# Patient Record
Sex: MALE | Race: WHITE | NOT HISPANIC OR LATINO | URBAN - METROPOLITAN AREA
[De-identification: names, ages, dates, MRNs, and addresses within clinical notes are randomized per-mention and may not be internally consistent; named-entity substitution may affect disease eponyms.]

---

## 2017-04-12 ENCOUNTER — APPOINTMENT (RX ONLY)
Dept: URBAN - METROPOLITAN AREA CLINIC 124 | Facility: CLINIC | Age: 77
Setting detail: DERMATOLOGY
End: 2017-04-12

## 2017-04-12 DIAGNOSIS — L0390 CELLULITIS AND ABSCESS OF UNSPECIFIED SITES: ICD-10-CM

## 2017-04-12 DIAGNOSIS — L0391 CELLULITIS AND ABSCESS OF UNSPECIFIED SITES: ICD-10-CM

## 2017-04-12 PROBLEM — C18.9 MALIGNANT NEOPLASM OF COLON, UNSPECIFIED: Status: ACTIVE | Noted: 2017-04-12

## 2017-04-12 PROBLEM — L03.818 CELLULITIS OF OTHER SITES: Status: ACTIVE | Noted: 2017-04-12

## 2017-04-12 PROBLEM — L23.7 ALLERGIC CONTACT DERMATITIS DUE TO PLANTS, EXCEPT FOOD: Status: ACTIVE | Noted: 2017-04-12

## 2017-04-12 PROBLEM — L55.1 SUNBURN OF SECOND DEGREE: Status: ACTIVE | Noted: 2017-04-12

## 2017-04-12 PROCEDURE — ? COUNSELING

## 2017-04-12 PROCEDURE — 99202 OFFICE O/P NEW SF 15 MIN: CPT

## 2017-04-12 PROCEDURE — ? PATIENT SPECIFIC COUNSELING

## 2017-04-12 NOTE — PROCEDURE: PATIENT SPECIFIC COUNSELING
Detail Level: Simple
Clinically area appears to be resolving at this time. Recommend photo today and f/u in 2 days. Patient will continue the Bactrim as prescribed. Instructed him to go to the ED if he notes worsening redness or swelling between now and then. He has no systemic symptoms.

## 2017-04-14 ENCOUNTER — APPOINTMENT (RX ONLY)
Dept: URBAN - METROPOLITAN AREA CLINIC 124 | Facility: CLINIC | Age: 77
Setting detail: DERMATOLOGY
End: 2017-04-14

## 2017-04-14 DIAGNOSIS — L82.1 OTHER SEBORRHEIC KERATOSIS: ICD-10-CM

## 2017-04-14 DIAGNOSIS — L0390 CELLULITIS AND ABSCESS OF UNSPECIFIED SITES: ICD-10-CM | Status: RESOLVING

## 2017-04-14 DIAGNOSIS — L57.8 OTHER SKIN CHANGES DUE TO CHRONIC EXPOSURE TO NONIONIZING RADIATION: ICD-10-CM

## 2017-04-14 DIAGNOSIS — L0391 CELLULITIS AND ABSCESS OF UNSPECIFIED SITES: ICD-10-CM | Status: RESOLVING

## 2017-04-14 DIAGNOSIS — L81.4 OTHER MELANIN HYPERPIGMENTATION: ICD-10-CM

## 2017-04-14 PROBLEM — L03.312 CELLULITIS OF BACK [ANY PART EXCEPT BUTTOCK]: Status: ACTIVE | Noted: 2017-04-14

## 2017-04-14 PROCEDURE — 99214 OFFICE O/P EST MOD 30 MIN: CPT

## 2017-04-14 PROCEDURE — ? PRESCRIPTION

## 2017-04-14 RX ORDER — SULFAMETHOXAZOLE AND TRIMETHOPRIM 800; 160 MG/1; MG/1
TABLET ORAL BID
Qty: 14 | Refills: 0 | Status: ERX | COMMUNITY
Start: 2017-04-14

## 2017-04-14 RX ADMIN — SULFAMETHOXAZOLE AND TRIMETHOPRIM: 800; 160 TABLET ORAL at 18:44

## 2017-04-14 ASSESSMENT — LOCATION DETAILED DESCRIPTION DERM
LOCATION DETAILED: RIGHT MEDIAL SUPERIOR CHEST
LOCATION DETAILED: RIGHT INFERIOR MEDIAL FOREHEAD
LOCATION DETAILED: RIGHT MID-UPPER BACK
LOCATION DETAILED: LEFT SUPERIOR MEDIAL MIDBACK

## 2017-04-14 ASSESSMENT — LOCATION ZONE DERM
LOCATION ZONE: TRUNK
LOCATION ZONE: FACE

## 2017-04-14 ASSESSMENT — LOCATION SIMPLE DESCRIPTION DERM
LOCATION SIMPLE: LEFT LOWER BACK
LOCATION SIMPLE: RIGHT UPPER BACK
LOCATION SIMPLE: CHEST
LOCATION SIMPLE: RIGHT FOREHEAD

## 2017-04-14 NOTE — HPI: EVALUATION OF SKIN LESION(S)
How Severe Are Your Spot(S)?: moderate
Have Your Spot(S) Been Treated In The Past?: has been treated
Hpi Title: Evaluation of Skin Lesions
When Was It Treated?: 04/13/2017

## 2017-04-14 NOTE — PROCEDURE: MIPS QUALITY
Quality 47: Advance Care Plan: Advance Care Planning discussed and documented in the medical record; patient did not wish or was not able to name a surrogate decision maker or provide an advance care plan.
Detail Level: Detailed
Quality 110: Preventive Care And Screening: Influenza Immunization: Influenza Immunization previously received during influenza season
Quality 131: Pain Assessment And Follow-Up: Pain assessment using a standardized tool is documented as negative, no follow-up plan required
Quality 111:Pneumonia Vaccination Status For Older Adults: Pneumococcal Vaccination not Administered or Previously Received, Reason not Otherwise Specified
Quality 226: Preventive Care And Screening: Tobacco Use: Screening And Cessation Intervention: Patient screened for tobacco and never smoked
Quality 130: Documentation Of Current Medications In The Medical Record: Current Medications Documented

## 2017-04-17 ENCOUNTER — APPOINTMENT (RX ONLY)
Dept: URBAN - METROPOLITAN AREA CLINIC 124 | Facility: CLINIC | Age: 77
Setting detail: DERMATOLOGY
End: 2017-04-17

## 2017-04-26 ENCOUNTER — APPOINTMENT (RX ONLY)
Dept: URBAN - METROPOLITAN AREA CLINIC 124 | Facility: CLINIC | Age: 77
Setting detail: DERMATOLOGY
End: 2017-04-26

## 2017-04-26 DIAGNOSIS — L0390 CELLULITIS AND ABSCESS OF UNSPECIFIED SITES: ICD-10-CM | Status: IMPROVED

## 2017-04-26 DIAGNOSIS — L0391 CELLULITIS AND ABSCESS OF UNSPECIFIED SITES: ICD-10-CM | Status: IMPROVED

## 2017-04-26 PROBLEM — L03.818 CELLULITIS OF OTHER SITES: Status: ACTIVE | Noted: 2017-04-26

## 2017-04-26 PROCEDURE — 99212 OFFICE O/P EST SF 10 MIN: CPT

## 2017-04-26 PROCEDURE — ? PATIENT SPECIFIC COUNSELING

## 2017-04-26 PROCEDURE — ? COUNSELING

## 2017-04-26 NOTE — PROCEDURE: PATIENT SPECIFIC COUNSELING
Detail Level: Zone
Original inflammation on the left upper back may have been cystic in nature and could require an excision. If he notes recurrence of the inflammation, he will contact our office immediately.

## 2018-08-14 RX ORDER — ASPIRIN 81 MG/1
81 TABLET ORAL DAILY
COMMUNITY
Start: 2014-08-21

## 2018-08-14 RX ORDER — ATORVASTATIN CALCIUM 40 MG/1
40 TABLET, FILM COATED ORAL DAILY
COMMUNITY
Start: 2017-09-28 | End: 2018-08-16 | Stop reason: SDUPTHER

## 2018-08-16 ENCOUNTER — OFFICE VISIT (OUTPATIENT)
Dept: CARDIOLOGY | Facility: CLINIC | Age: 78
End: 2018-08-16
Payer: MEDICARE

## 2018-08-16 VITALS
BODY MASS INDEX: 30.06 KG/M2 | SYSTOLIC BLOOD PRESSURE: 120 MMHG | RESPIRATION RATE: 16 BRPM | WEIGHT: 210 LBS | HEART RATE: 67 BPM | DIASTOLIC BLOOD PRESSURE: 70 MMHG | HEIGHT: 70 IN

## 2018-08-16 DIAGNOSIS — I25.10 CAD IN NATIVE ARTERY: Primary | ICD-10-CM

## 2018-08-16 PROBLEM — F17.201 TOBACCO ABUSE, IN REMISSION: Status: ACTIVE | Noted: 2018-08-16

## 2018-08-16 PROCEDURE — 99214 OFFICE O/P EST MOD 30 MIN: CPT | Performed by: INTERNAL MEDICINE

## 2018-08-16 PROCEDURE — 93000 ELECTROCARDIOGRAM COMPLETE: CPT | Performed by: INTERNAL MEDICINE

## 2018-08-16 RX ORDER — ATORVASTATIN CALCIUM 40 MG/1
40 TABLET, FILM COATED ORAL DAILY
Qty: 90 TABLET | Refills: 2 | Status: SHIPPED | OUTPATIENT
Start: 2018-08-16 | End: 2018-08-28 | Stop reason: SDUPTHER

## 2018-08-16 RX ORDER — TRIAMCINOLONE ACETONIDE 1 MG/G
OINTMENT TOPICAL
Refills: 3 | COMMUNITY
Start: 2018-06-21

## 2018-08-16 ASSESSMENT — ENCOUNTER SYMPTOMS
HEMATOLOGIC/LYMPHATIC NEGATIVE: 1
GASTROINTESTINAL NEGATIVE: 1
NEUROLOGICAL NEGATIVE: 1
RESPIRATORY NEGATIVE: 1
ENDOCRINE NEGATIVE: 1
CONSTITUTIONAL NEGATIVE: 1
CARDIOVASCULAR NEGATIVE: 1
PSYCHIATRIC NEGATIVE: 1
EYES NEGATIVE: 1

## 2018-08-16 NOTE — LETTER
August 16, 2018     Armin Aaron ., DO  [address] [place] [address]    Patient: Naif De La Garza   YOB: 1940   Date of Visit: 8/16/2018       Dear Dr. Aaron:    Thank you for referring Naif De La Garza to me for evaluation. Below are my notes for this consultation.    If you have questions, please do not hesitate to call me. I look forward to following your patient along with you.         Sincerely,        Andrew Barrow DO        CC: No Recipients  Andrew Barrow DO  8/16/2018 11:25 AM  Signed      Chief Complaint: I saw Baljeet in the office today on a routine visit for his hypercholesterolemia.  As you know he has had a bad CT calcium score but has had a negative stress test for active ischemic heart disease.  He also has an elevated cholesterol which were treating him with atorvastatin 40 mg daily.  Baljeet has no complaints of chest discomfort or shortness of breath with exertion, anxiety, cold weather, postprandially, with sex, rest, or nocturnally.  He denies exertional dyspnea, proximal nocturnal dyspnea, orthopnea, palpitations, syncope, ankle edema, he has nocturia.       Medications:  Current Outpatient Prescriptions on File Prior to Visit   Medication Sig Dispense Refill   • aspirin (ASPIR-LOW) 81 mg enteric coated tablet Take 81 mg by mouth daily.     • atorvastatin (LIPITOR) 40 mg tablet Take 40 mg by mouth daily.     • vitamins  A,C,E-zinc-copper (PRESERVISION AREDS) 14,320-226-200 unit-mg-unit capsule 2 capsules daily       No current facility-administered medications on file prior to visit.        Review of Systems:  Review of Systems   Constitution: Negative.   HENT: Negative.    Eyes: Negative.    Cardiovascular: Negative.    Respiratory: Negative.    Endocrine: Negative.    Hematologic/Lymphatic: Negative.    Skin: Positive for rash.   Musculoskeletal: Positive for arthritis.   Gastrointestinal: Negative.    Genitourinary: Positive for nocturia.   Neurological: Negative.     Psychiatric/Behavioral: Negative.    Allergic/Immunologic: Positive for environmental allergies.       Physical Exam:  Vitals:    08/16/18 1036   BP: 120/70   Pulse: 67   Resp: 16     Physical Exam   Constitutional: He is oriented to person, place, and time. He appears well-developed and well-nourished.   overweight   HENT:   Head: Normocephalic and atraumatic.   Eyes: Conjunctivae and EOM are normal. Pupils are equal, round, and reactive to light.   Neck: Normal range of motion. Neck supple.   Cardiovascular: Normal rate, regular rhythm and intact distal pulses.  Exam reveals gallop.    Pulmonary/Chest: Effort normal and breath sounds normal.   Abdominal: Soft. Bowel sounds are normal.   Musculoskeletal: Normal range of motion.   Neurological: He is alert and oriented to person, place, and time. He has normal reflexes.   Skin: Skin is warm and dry.   Psychiatric: He has a normal mood and affect. His behavior is normal. Judgment and thought content normal.     EKG:SRr WNL  Assessment and Plan:  Impression:  Hypercholesterolemia.  Labile blood pressure.  Psoriasis.  Recommendation:  We will see him again in 10 months time a month after he gets his repeat lipid profile with you.  We talked to him extensively about diet and exercise.  If there is a problem we will see him sooner than 10 months.  Thank you so much following see Naif in the office today.    Andrew Barrow,

## 2018-08-16 NOTE — PROGRESS NOTES
Chief Complaint: I saw Baljeet in the office today on a routine visit for his hypercholesterolemia.  As you know he has had a bad CT calcium score but has had a negative stress test for active ischemic heart disease.  He also has an elevated cholesterol which were treating him with atorvastatin 40 mg daily.  Baljeet has no complaints of chest discomfort or shortness of breath with exertion, anxiety, cold weather, postprandially, with sex, rest, or nocturnally.  He denies exertional dyspnea, proximal nocturnal dyspnea, orthopnea, palpitations, syncope, ankle edema, he has nocturia.       Medications:  Current Outpatient Prescriptions on File Prior to Visit   Medication Sig Dispense Refill   • aspirin (ASPIR-LOW) 81 mg enteric coated tablet Take 81 mg by mouth daily.     • atorvastatin (LIPITOR) 40 mg tablet Take 40 mg by mouth daily.     • vitamins  A,C,E-zinc-copper (PRESERVISION AREDS) 14,320-226-200 unit-mg-unit capsule 2 capsules daily       No current facility-administered medications on file prior to visit.        Review of Systems:  Review of Systems   Constitution: Negative.   HENT: Negative.    Eyes: Negative.    Cardiovascular: Negative.    Respiratory: Negative.    Endocrine: Negative.    Hematologic/Lymphatic: Negative.    Skin: Positive for rash.   Musculoskeletal: Positive for arthritis.   Gastrointestinal: Negative.    Genitourinary: Positive for nocturia.   Neurological: Negative.    Psychiatric/Behavioral: Negative.    Allergic/Immunologic: Positive for environmental allergies.       Physical Exam:  Vitals:    08/16/18 1036   BP: 120/70   Pulse: 67   Resp: 16     Physical Exam   Constitutional: He is oriented to person, place, and time. He appears well-developed and well-nourished.   overweight   HENT:   Head: Normocephalic and atraumatic.   Eyes: Conjunctivae and EOM are normal. Pupils are equal, round, and reactive to light.   Neck: Normal range of motion. Neck supple.   Cardiovascular: Normal  rate, regular rhythm and intact distal pulses.  Exam reveals gallop.    Pulmonary/Chest: Effort normal and breath sounds normal.   Abdominal: Soft. Bowel sounds are normal.   Musculoskeletal: Normal range of motion.   Neurological: He is alert and oriented to person, place, and time. He has normal reflexes.   Skin: Skin is warm and dry.   Psychiatric: He has a normal mood and affect. His behavior is normal. Judgment and thought content normal.     EKG:SRr WNL  Assessment and Plan:  Impression:  Hypercholesterolemia.  Labile blood pressure.  Psoriasis.  Recommendation:  We will see him again in 10 months time a month after he gets his repeat lipid profile with you.  We talked to him extensively about diet and exercise.  If there is a problem we will see him sooner than 10 months.  Thank you so much following see Naif in the office today.    Andrew Barrow, DO

## 2018-08-28 RX ORDER — ATORVASTATIN CALCIUM 40 MG/1
40 TABLET, FILM COATED ORAL DAILY
Qty: 90 TABLET | Refills: 3 | Status: SHIPPED | OUTPATIENT
Start: 2018-08-28 | End: 2019-06-06 | Stop reason: SDUPTHER

## 2019-06-06 ENCOUNTER — OFFICE VISIT (OUTPATIENT)
Dept: CARDIOLOGY | Facility: CLINIC | Age: 79
End: 2019-06-06
Payer: MEDICARE

## 2019-06-06 VITALS
DIASTOLIC BLOOD PRESSURE: 70 MMHG | WEIGHT: 214.2 LBS | RESPIRATION RATE: 16 BRPM | BODY MASS INDEX: 30.67 KG/M2 | HEIGHT: 70 IN | HEART RATE: 75 BPM | SYSTOLIC BLOOD PRESSURE: 114 MMHG

## 2019-06-06 DIAGNOSIS — I34.0 NON-RHEUMATIC MITRAL REGURGITATION: Primary | ICD-10-CM

## 2019-06-06 PROCEDURE — 99214 OFFICE O/P EST MOD 30 MIN: CPT | Performed by: INTERNAL MEDICINE

## 2019-06-06 PROCEDURE — 93000 ELECTROCARDIOGRAM COMPLETE: CPT | Performed by: INTERNAL MEDICINE

## 2019-06-06 RX ORDER — ATORVASTATIN CALCIUM 40 MG/1
40 TABLET, FILM COATED ORAL DAILY
Qty: 90 TABLET | Refills: 3 | Status: SHIPPED | OUTPATIENT
Start: 2019-06-06 | End: 2019-12-19 | Stop reason: SDUPTHER

## 2019-06-06 ASSESSMENT — ENCOUNTER SYMPTOMS
BRUISES/BLEEDS EASILY: 1
NEUROLOGICAL NEGATIVE: 1
GASTROINTESTINAL NEGATIVE: 1
EYES NEGATIVE: 1
CARDIOVASCULAR NEGATIVE: 1
CONSTITUTIONAL NEGATIVE: 1
ROS SKIN COMMENTS: ECZEMA
PSYCHIATRIC NEGATIVE: 1
JOINT SWELLING: 1
SPUTUM PRODUCTION: 1
ENDOCRINE NEGATIVE: 1

## 2019-06-06 NOTE — PROGRESS NOTES
Chief Complaint: I saw Naif in the office today on a routine visit for his hypertension mild valvular disease diabetes mellitus type 2 and pulmonary hypertension.  He is doing quite nicely he denies any form of chest discomfort or shortness of breath with exertion, anxiety, cold weather, postprandially, with sex, at rest, or nocturnally.  He can climb a flight of stairs without getting short of breath, he denies proximal nocturnal dyspnea, orthopnea, palpitations, syncope, ankle edema, he has nocturia.  His lipid profile is excellent.       Medications:  Current Outpatient Prescriptions   Medication Sig Dispense Refill   • aspirin (ASPIR-LOW) 81 mg enteric coated tablet Take 81 mg by mouth daily.     • atorvastatin (LIPITOR) 40 mg tablet Take 1 tablet (40 mg total) by mouth daily. 90 tablet 3   • triamcinolone (KENALOG) 0.1 % ointment APPLY TO THE AFFECTED AREA(S) TWICE DAILY FOR UP TO TWO WEEKS AS NEEDED FOR eczema  3   • ubidecarenone (CO Q-10 ORAL) Take 1 tablet by mouth daily.     • vitamins  A,C,E-zinc-copper (PRESERVISION AREDS) 14,320-226-200 unit-mg-unit capsule 2 capsules daily       No current facility-administered medications for this visit.        Review of Systems:  Review of Systems   Constitution: Negative.   HENT: Negative.    Eyes: Negative.    Cardiovascular: Negative.    Respiratory: Positive for sputum production.         Sputum   Endocrine: Negative.    Hematologic/Lymphatic: Bruises/bleeds easily.   Skin:        eczema   Musculoskeletal: Positive for joint swelling.   Gastrointestinal: Negative.    Genitourinary: Positive for nocturia.   Neurological: Negative.    Psychiatric/Behavioral: Negative.    Allergic/Immunologic: Positive for environmental allergies.       Physical Exam:  Vitals:    06/06/19 0931   BP: 114/70   Pulse: 75   Resp: 16     Physical Exam   Constitutional: He is oriented to person, place, and time. He appears well-developed and well-nourished.   HENT:   Head:  Normocephalic and atraumatic.   Eyes: Pupils are equal, round, and reactive to light. Conjunctivae and EOM are normal.   Neck: Normal range of motion. Neck supple.   Cardiovascular: Normal rate, regular rhythm, normal heart sounds and intact distal pulses.    Pulmonary/Chest: Effort normal and breath sounds normal.   Abdominal: Soft. Bowel sounds are normal.   Musculoskeletal: Normal range of motion.   Neurological: He is alert and oriented to person, place, and time. He has normal strength and normal reflexes.   Skin: Skin is warm, dry and intact.   Psychiatric: He has a normal mood and affect. His speech is normal and behavior is normal. Judgment and thought content normal. Cognition and memory are normal.     EKG:  acclerated zoltan rhythm ST-tabn no [id number] change    Assessment and Plan:  Impression:  Hypertension controlled.  Hypercholesterolemia well-controlled.  Atrial fibrillation now sinus rhythm.  Chronic renal insufficiency.  Type 2 diabetes mellitus controlled.  Mitral regurgitation mild.  Tricuspid regurgitation mild.  Recommendation:  Naif is hemodynamically stable we did  him on diet and exercise.  He needs to lose weight but his lipid profile is outstanding as is his hemoglobin A1c.  We will see him again in 6 months time, if there is a problem we will see him sooner.  Thank you for the opportunity seeing this very nice gentleman in the office today.    Andrew Barrow,

## 2019-06-06 NOTE — LETTER
June 6, 2019     Armin Aaron ., DO  [address] [place] [address]    Patient: Naif De La Garza  YOB: 1940  Date of Visit: 6/6/2019      Dear Dr. Aaron:    Thank you for referring Naif De La Garza to me for evaluation. Below are my notes for this consultation.    If you have questions, please do not hesitate to call me. I look forward to following your patient along with you.         Sincerely,        Andrew Barrow DO        CC: No Recipients  Andrew Barrow DO  6/6/2019 10:17 AM  Signed      Chief Complaint: I saw Naif in the office today on a routine visit for his hypertension mild valvular disease diabetes mellitus type 2 and pulmonary hypertension.  He is doing quite nicely he denies any form of chest discomfort or shortness of breath with exertion, anxiety, cold weather, postprandially, with sex, at rest, or nocturnally.  He can climb a flight of stairs without getting short of breath, he denies proximal nocturnal dyspnea, orthopnea, palpitations, syncope, ankle edema, he has nocturia.  His lipid profile is excellent.       Medications:  Current Outpatient Prescriptions   Medication Sig Dispense Refill   • aspirin (ASPIR-LOW) 81 mg enteric coated tablet Take 81 mg by mouth daily.     • atorvastatin (LIPITOR) 40 mg tablet Take 1 tablet (40 mg total) by mouth daily. 90 tablet 3   • triamcinolone (KENALOG) 0.1 % ointment APPLY TO THE AFFECTED AREA(S) TWICE DAILY FOR UP TO TWO WEEKS AS NEEDED FOR eczema  3   • ubidecarenone (CO Q-10 ORAL) Take 1 tablet by mouth daily.     • vitamins  A,C,E-zinc-copper (PRESERVISION AREDS) 14,320-226-200 unit-mg-unit capsule 2 capsules daily       No current facility-administered medications for this visit.        Review of Systems:  Review of Systems   Constitution: Negative.   HENT: Negative.    Eyes: Negative.    Cardiovascular: Negative.    Respiratory: Positive for sputum production.         Sputum   Endocrine: Negative.    Hematologic/Lymphatic:  Bruises/bleeds easily.   Skin:        eczema   Musculoskeletal: Positive for joint swelling.   Gastrointestinal: Negative.    Genitourinary: Positive for nocturia.   Neurological: Negative.    Psychiatric/Behavioral: Negative.    Allergic/Immunologic: Positive for environmental allergies.       Physical Exam:  Vitals:    06/06/19 0931   BP: 114/70   Pulse: 75   Resp: 16     Physical Exam   Constitutional: He is oriented to person, place, and time. He appears well-developed and well-nourished.   HENT:   Head: Normocephalic and atraumatic.   Eyes: Pupils are equal, round, and reactive to light. Conjunctivae and EOM are normal.   Neck: Normal range of motion. Neck supple.   Cardiovascular: Normal rate, regular rhythm, normal heart sounds and intact distal pulses.    Pulmonary/Chest: Effort normal and breath sounds normal.   Abdominal: Soft. Bowel sounds are normal.   Musculoskeletal: Normal range of motion.   Neurological: He is alert and oriented to person, place, and time. He has normal strength and normal reflexes.   Skin: Skin is warm, dry and intact.   Psychiatric: He has a normal mood and affect. His speech is normal and behavior is normal. Judgment and thought content normal. Cognition and memory are normal.     EKG:  acclerated zoltan rhythm ST-tabn no [id number] change    Assessment and Plan:  Impression:  Hypertension controlled.  Hypercholesterolemia well-controlled.  Atrial fibrillation now sinus rhythm.  Chronic renal insufficiency.  Type 2 diabetes mellitus controlled.  Mitral regurgitation mild.  Tricuspid regurgitation mild.  Recommendation:  Naif is hemodynamically stable we did  him on diet and exercise.  He needs to lose weight but his lipid profile is outstanding as is his hemoglobin A1c.  We will see him again in 6 months time, if there is a problem we will see him sooner.  Thank you for the opportunity seeing this very nice gentleman in the office today.    Andrew Barrow, DO

## 2019-12-19 ENCOUNTER — OFFICE VISIT (OUTPATIENT)
Dept: CARDIOLOGY | Facility: CLINIC | Age: 79
End: 2019-12-19
Payer: MEDICARE

## 2019-12-19 VITALS
SYSTOLIC BLOOD PRESSURE: 112 MMHG | HEIGHT: 70 IN | BODY MASS INDEX: 31.81 KG/M2 | HEART RATE: 68 BPM | DIASTOLIC BLOOD PRESSURE: 72 MMHG | WEIGHT: 222.2 LBS | RESPIRATION RATE: 14 BRPM | TEMPERATURE: 98.6 F

## 2019-12-19 DIAGNOSIS — I34.0 NONRHEUMATIC MITRAL VALVE REGURGITATION: Primary | ICD-10-CM

## 2019-12-19 DIAGNOSIS — I10 ESSENTIAL HYPERTENSION: ICD-10-CM

## 2019-12-19 PROCEDURE — 93000 ELECTROCARDIOGRAM COMPLETE: CPT | Performed by: INTERNAL MEDICINE

## 2019-12-19 PROCEDURE — 99214 OFFICE O/P EST MOD 30 MIN: CPT | Performed by: INTERNAL MEDICINE

## 2019-12-19 RX ORDER — ATORVASTATIN CALCIUM 40 MG/1
40 TABLET, FILM COATED ORAL DAILY
Qty: 90 TABLET | Refills: 3 | Status: CANCELLED | OUTPATIENT
Start: 2019-12-19

## 2019-12-19 RX ORDER — ATORVASTATIN CALCIUM 40 MG/1
40 TABLET, FILM COATED ORAL DAILY
Qty: 90 TABLET | Refills: 3 | Status: SHIPPED | OUTPATIENT
Start: 2019-12-19 | End: 2020-06-15 | Stop reason: SDUPTHER

## 2019-12-19 ASSESSMENT — ENCOUNTER SYMPTOMS
MUSCULOSKELETAL NEGATIVE: 1
ROS SKIN COMMENTS: PSORIASIS
PSYCHIATRIC NEGATIVE: 1
ENDOCRINE NEGATIVE: 1
CARDIOVASCULAR NEGATIVE: 1
NEUROLOGICAL NEGATIVE: 1
CONSTITUTIONAL NEGATIVE: 1
GASTROINTESTINAL NEGATIVE: 1
BRUISES/BLEEDS EASILY: 1
EYES NEGATIVE: 1
RESPIRATORY NEGATIVE: 1

## 2019-12-19 NOTE — PROGRESS NOTES
Chief Complaint: I saw Baljeet in the office today on a routine visit for his hypertension and high cholesterol.  He is doing well denies any form of chest discomfort or shortness of breath with exertion, anxiety, cold weather, postprandially, with sex, at rest, or nocturnally.  He denies exertional dyspnea, proximal nocturnal dyspnea, orthopnea, palpitations, syncope, ankle edema, he has nocturia.       Medications:  Current Outpatient Medications   Medication Sig Dispense Refill   • aspirin (ASPIR-LOW) 81 mg enteric coated tablet Take 81 mg by mouth daily.     • atorvastatin (LIPITOR) 40 mg tablet Take 1 tablet (40 mg total) by mouth daily. 90 tablet 3   • triamcinolone (KENALOG) 0.1 % ointment APPLY TO THE AFFECTED AREA(S) TWICE DAILY FOR UP TO TWO WEEKS AS NEEDED FOR eczema  3   • ubidecarenone (CO Q-10 ORAL) Take 1 tablet by mouth daily.     • vitamins  A,C,E-zinc-copper (PRESERVISION AREDS) 14,320-226-200 unit-mg-unit capsule 2 capsules daily       No current facility-administered medications for this visit.        Review of Systems:  Review of Systems   Constitution: Negative.   HENT: Positive for congestion.    Eyes: Negative.    Cardiovascular: Negative.    Respiratory: Negative.    Endocrine: Negative.    Hematologic/Lymphatic: Bruises/bleeds easily.   Skin: Positive for rash.        psoriasis   Musculoskeletal: Negative.    Gastrointestinal: Negative.    Genitourinary: Positive for nocturia.   Neurological: Negative.    Psychiatric/Behavioral: Negative.    Allergic/Immunologic: Positive for environmental allergies.       Physical Exam:  Vitals:    12/19/19 0826   BP: 112/72   Pulse: 68   Resp: 14   Temp: 37 °C (98.6 °F)     Physical Exam   Constitutional: He is oriented to person, place, and time. He appears well-developed and well-nourished.   HENT:   Head: Normocephalic and atraumatic.   Eyes: Pupils are equal, round, and reactive to light. Conjunctivae and EOM are normal.   Neck: Normal range of  motion. Neck supple.   Cardiovascular: Normal rate, regular rhythm and intact distal pulses.   Murmur (2/6 systolic murmur is aortic sclerosis) heard.  Pulmonary/Chest: Effort normal and breath sounds normal.   Abdominal: Soft. Bowel sounds are normal.   Musculoskeletal: Normal range of motion.   Neurological: He is alert and oriented to person, place, and time. He has normal strength and normal reflexes.   Skin: Skin is warm, dry and intact.   Psychiatric: He has a normal mood and affect. His speech is normal and behavior is normal. Judgment and thought content normal. Cognition and memory are normal.     EKG:  SR early transition V2 ST-tabn of early repolarizatioin    Assessment and Plan:  Impression:  Hypertension controlled.  Atrial fibrillation now sinus rhythm.  Mitral regurgitation.  Tricuspid regurgitation.  Hyperlipidemia.  Diabetes mellitus type 2.  Pulmonary hypertension.  Recommendation:  He is doing quite well his weight is neither up or down his lipid profile is good his sugars are good we encouraged him and counseled him about diet and exercise.  We will see him again in 6 months time and repeat his echocardiogram because it will be 4 years since his last echo to follow his mitral regurgitation tricuspid regurgitation.  Thank you for allowing us to see this very nice gentleman in the office today.    Andrew Barrow, DO

## 2019-12-19 NOTE — LETTER
December 19, 2019     Armin Aaron Sr., DO  [address] [place] [address]    Patient: Naif De La Garza  YOB: 1940  Date of Visit: 12/19/2019      Dear Dr. Aaron:    Thank you for referring Naif De La Garza to me for evaluation. Below are my notes for this consultation.    If you have questions, please do not hesitate to call me. I look forward to following your patient along with you.         Sincerely,        Andrew Barrow DO        CC: No Recipients  Andrew Barrow DO  12/19/2019  9:13 AM  Signed      Chief Complaint: I saw Blajeet in the office today on a routine visit for his hypertension and high cholesterol.  He is doing well denies any form of chest discomfort or shortness of breath with exertion, anxiety, cold weather, postprandially, with sex, at rest, or nocturnally.  He denies exertional dyspnea, proximal nocturnal dyspnea, orthopnea, palpitations, syncope, ankle edema, he has nocturia.       Medications:  Current Outpatient Medications   Medication Sig Dispense Refill   • aspirin (ASPIR-LOW) 81 mg enteric coated tablet Take 81 mg by mouth daily.     • atorvastatin (LIPITOR) 40 mg tablet Take 1 tablet (40 mg total) by mouth daily. 90 tablet 3   • triamcinolone (KENALOG) 0.1 % ointment APPLY TO THE AFFECTED AREA(S) TWICE DAILY FOR UP TO TWO WEEKS AS NEEDED FOR eczema  3   • ubidecarenone (CO Q-10 ORAL) Take 1 tablet by mouth daily.     • vitamins  A,C,E-zinc-copper (PRESERVISION AREDS) 14,320-226-200 unit-mg-unit capsule 2 capsules daily       No current facility-administered medications for this visit.        Review of Systems:  Review of Systems   Constitution: Negative.   HENT: Positive for congestion.    Eyes: Negative.    Cardiovascular: Negative.    Respiratory: Negative.    Endocrine: Negative.    Hematologic/Lymphatic: Bruises/bleeds easily.   Skin: Positive for rash.        psoriasis   Musculoskeletal: Negative.    Gastrointestinal: Negative.    Genitourinary: Positive for  nocturia.   Neurological: Negative.    Psychiatric/Behavioral: Negative.    Allergic/Immunologic: Positive for environmental allergies.       Physical Exam:  Vitals:    12/19/19 0826   BP: 112/72   Pulse: 68   Resp: 14   Temp: 37 °C (98.6 °F)     Physical Exam   Constitutional: He is oriented to person, place, and time. He appears well-developed and well-nourished.   HENT:   Head: Normocephalic and atraumatic.   Eyes: Pupils are equal, round, and reactive to light. Conjunctivae and EOM are normal.   Neck: Normal range of motion. Neck supple.   Cardiovascular: Normal rate, regular rhythm and intact distal pulses.   Murmur (2/6 systolic murmur is aortic sclerosis) heard.  Pulmonary/Chest: Effort normal and breath sounds normal.   Abdominal: Soft. Bowel sounds are normal.   Musculoskeletal: Normal range of motion.   Neurological: He is alert and oriented to person, place, and time. He has normal strength and normal reflexes.   Skin: Skin is warm, dry and intact.   Psychiatric: He has a normal mood and affect. His speech is normal and behavior is normal. Judgment and thought content normal. Cognition and memory are normal.     EKG:  SR early transition V2 ST-tabn of early repolarizatioin    Assessment and Plan:  Impression:  Hypertension controlled.  Atrial fibrillation now sinus rhythm.  Mitral regurgitation.  Tricuspid regurgitation.  Hyperlipidemia.  Diabetes mellitus type 2.  Pulmonary hypertension.  Recommendation:  He is doing quite well his weight is neither up or down his lipid profile is good his sugars are good we encouraged him and counseled him about diet and exercise.  We will see him again in 6 months time and repeat his echocardiogram because it will be 4 years since his last echo to follow his mitral regurgitation tricuspid regurgitation.  Thank you for allowing us to see this very nice gentleman in the office today.    Andrew Barrow,

## 2020-06-15 RX ORDER — ATORVASTATIN CALCIUM 40 MG/1
40 TABLET, FILM COATED ORAL DAILY
Qty: 30 TABLET | Refills: 0 | Status: SHIPPED | OUTPATIENT
Start: 2020-06-15 | End: 2020-10-06 | Stop reason: SDUPTHER

## 2020-06-15 NOTE — TELEPHONE ENCOUNTER
Medicine Refill Request    Last Office Visit: Visit date not found  Last Telemedicine Visit: Visit date not found    Next Office Visit: Visit date not found  Next Telemedicine Visit: Visit date not found     Pt states that he is currently in [place] and would like to know if a 30 day supply of atorvastatin can be sent to [facility] in [place].         Current Outpatient Medications:   •  aspirin (ASPIR-LOW) 81 mg enteric coated tablet, Take 81 mg by mouth daily., Disp: , Rfl:   •  atorvastatin (LIPITOR) 40 mg tablet, Take 1 tablet (40 mg total) by mouth daily., Disp: 90 tablet, Rfl: 3  •  triamcinolone (KENALOG) 0.1 % ointment, APPLY TO THE AFFECTED AREA(S) TWICE DAILY FOR UP TO TWO WEEKS AS NEEDED FOR eczema, Disp: , Rfl: 3  •  ubidecarenone (CO Q-10 ORAL), Take 1 tablet by mouth daily., Disp: , Rfl:   •  vitamins  A,C,E-zinc-copper (PRESERVISION AREDS) 14,320-226-200 unit-mg-unit capsule, 2 capsules daily, Disp: , Rfl:       BP Readings from Last 3 Encounters:   12/19/19 112/72   06/06/19 114/70   08/16/18 120/70       Recent Lab results:  No results found for: CHOL, No results found for: HDL, No results found for: LDLCALC, No results found for: TRIG     No results found for: GLUCOSE, No results found for: HGBA1C      No results found for: CREATININE    No results found for: TSH

## 2020-07-16 ENCOUNTER — OFFICE VISIT (OUTPATIENT)
Dept: CARDIOLOGY | Facility: CLINIC | Age: 80
End: 2020-07-16
Payer: MEDICARE

## 2020-07-16 VITALS
RESPIRATION RATE: 16 BRPM | HEIGHT: 69 IN | WEIGHT: 198 LBS | HEART RATE: 65 BPM | BODY MASS INDEX: 29.33 KG/M2 | SYSTOLIC BLOOD PRESSURE: 116 MMHG | DIASTOLIC BLOOD PRESSURE: 84 MMHG

## 2020-07-16 DIAGNOSIS — I25.119 CORONARY ARTERY DISEASE WITH ANGINA PECTORIS, UNSPECIFIED VESSEL OR LESION TYPE, UNSPECIFIED WHETHER NATIVE OR TRANSPLANTED HEART (CMS/HCC): Primary | ICD-10-CM

## 2020-07-16 PROCEDURE — 93000 ELECTROCARDIOGRAM COMPLETE: CPT | Performed by: INTERNAL MEDICINE

## 2020-07-16 PROCEDURE — 99214 OFFICE O/P EST MOD 30 MIN: CPT | Performed by: INTERNAL MEDICINE

## 2020-07-16 ASSESSMENT — ENCOUNTER SYMPTOMS
GASTROINTESTINAL NEGATIVE: 1
EYES NEGATIVE: 1
NEUROLOGICAL NEGATIVE: 1
MUSCULOSKELETAL NEGATIVE: 1
PSYCHIATRIC NEGATIVE: 1
CONSTITUTIONAL NEGATIVE: 1
ALLERGIC/IMMUNOLOGIC NEGATIVE: 1
ENDOCRINE NEGATIVE: 1
ROS SKIN COMMENTS: ECZEMA
RESPIRATORY NEGATIVE: 1
HEMATOLOGIC/LYMPHATIC NEGATIVE: 1
CARDIOVASCULAR NEGATIVE: 1

## 2020-07-16 NOTE — PROGRESS NOTES
Chief Complaint: I saw Naif in the office today on a routine visit for his hypertension and high cholesterol.  He is doing well and denies any form of chest discomfort or shortness of breath with exertion, anxiety, cold weather, postprandially, with sex, at rest, or nocturnally.  He denies exertional dyspnea, proximal nocturnal dyspnea, orthopnea, palpitations, syncope, ankle edema, he has nocturia.       Medications:  Current Outpatient Medications   Medication Sig Dispense Refill   • aspirin (ASPIR-LOW) 81 mg enteric coated tablet Take 81 mg by mouth daily.     • atorvastatin (LIPITOR) 40 mg tablet Take 1 tablet (40 mg total) by mouth daily. 30 tablet 0   • ubidecarenone (CO Q-10 ORAL) Take 1 tablet by mouth daily.     • vitamins  A,C,E-zinc-copper (PRESERVISION AREDS) 14,320-226-200 unit-mg-unit capsule 2 capsules daily     • triamcinolone (KENALOG) 0.1 % ointment APPLY TO THE AFFECTED AREA(S) TWICE DAILY FOR UP TO TWO WEEKS AS NEEDED FOR eczema  3     No current facility-administered medications for this visit.        Review of Systems:  Review of Systems   Constitution: Negative.   HENT: Negative.    Eyes: Negative.    Cardiovascular: Negative.    Respiratory: Negative.    Endocrine: Negative.    Hematologic/Lymphatic: Negative.    Skin: Negative.         eczema   Musculoskeletal: Negative.    Gastrointestinal: Negative.    Genitourinary: Positive for nocturia.   Neurological: Negative.    Psychiatric/Behavioral: Negative.    Allergic/Immunologic: Negative.        Physical Exam:  Vitals:    07/16/20 1455   BP: 116/84   Pulse: 65   Resp: 16     Physical Exam   Constitutional: He is oriented to person, place, and time. He appears well-developed and well-nourished.   HENT:   Head: Normocephalic and atraumatic.   Eyes: Pupils are equal, round, and reactive to light. Conjunctivae and EOM are normal.   Neck: Normal range of motion. Neck supple.   Cardiovascular: Normal rate, regular rhythm, normal heart sounds  and intact distal pulses.   Pulmonary/Chest: Effort normal and breath sounds normal.   Abdominal: Soft. Bowel sounds are normal.   Musculoskeletal: Normal range of motion.   Neurological: He is alert and oriented to person, place, and time. He has normal strength and normal reflexes.   Skin: Skin is warm, dry and intact.   Psychiatric: He has a normal mood and affect. His speech is normal and behavior is normal. Judgment and thought content normal. Cognition and memory are normal.     EKG: SR WNL    Assessment and Plan:  Impression:  Hypercholesterolemia.  Labile blood pressure.  Tricuspid regurgitation.  Paroxysmal atrial fibrillation.  Chronic renal insufficiency.  Hypelglycemia.  Recommendation:  He appears to be hemodynamically stable I did not make any changes in medications.  I did talk to him about diet and exercise.  Awaiting new forwarding to me his lab work.  We will see him in 6 months time, if there is a problem we will see him sooner.

## 2020-07-16 NOTE — LETTER
July 16, 2020     Armin Aaron Sr., DO  [address] [place] [address]    Patient: Naif De La Garza  YOB: 1940  Date of Visit: 7/16/2020      Dear Dr. Aaron:    Thank you for referring Naif De La Garza to me for evaluation. Below are my notes for this consultation.    If you have questions, please do not hesitate to call me. I look forward to following your patient along with you.         Sincerely,        Andrew Barrow DO        CC: No Recipients  Andrew Barrow DO  7/16/2020  4:17 PM  Signed      Chief Complaint: I saw Naif in the office today on a routine visit for his hypertension and high cholesterol.  He is doing well and denies any form of chest discomfort or shortness of breath with exertion, anxiety, cold weather, postprandially, with sex, at rest, or nocturnally.  He denies exertional dyspnea, proximal nocturnal dyspnea, orthopnea, palpitations, syncope, ankle edema, he has nocturia.       Medications:  Current Outpatient Medications   Medication Sig Dispense Refill   • aspirin (ASPIR-LOW) 81 mg enteric coated tablet Take 81 mg by mouth daily.     • atorvastatin (LIPITOR) 40 mg tablet Take 1 tablet (40 mg total) by mouth daily. 30 tablet 0   • ubidecarenone (CO Q-10 ORAL) Take 1 tablet by mouth daily.     • vitamins  A,C,E-zinc-copper (PRESERVISION AREDS) 14,320-226-200 unit-mg-unit capsule 2 capsules daily     • triamcinolone (KENALOG) 0.1 % ointment APPLY TO THE AFFECTED AREA(S) TWICE DAILY FOR UP TO TWO WEEKS AS NEEDED FOR eczema  3     No current facility-administered medications for this visit.        Review of Systems:  Review of Systems   Constitution: Negative.   HENT: Negative.    Eyes: Negative.    Cardiovascular: Negative.    Respiratory: Negative.    Endocrine: Negative.    Hematologic/Lymphatic: Negative.    Skin: Negative.         eczema   Musculoskeletal: Negative.    Gastrointestinal: Negative.    Genitourinary: Positive for nocturia.   Neurological: Negative.     Psychiatric/Behavioral: Negative.    Allergic/Immunologic: Negative.        Physical Exam:  Vitals:    07/16/20 1455   BP: 116/84   Pulse: 65   Resp: 16     Physical Exam   Constitutional: He is oriented to person, place, and time. He appears well-developed and well-nourished.   HENT:   Head: Normocephalic and atraumatic.   Eyes: Pupils are equal, round, and reactive to light. Conjunctivae and EOM are normal.   Neck: Normal range of motion. Neck supple.   Cardiovascular: Normal rate, regular rhythm, normal heart sounds and intact distal pulses.   Pulmonary/Chest: Effort normal and breath sounds normal.   Abdominal: Soft. Bowel sounds are normal.   Musculoskeletal: Normal range of motion.   Neurological: He is alert and oriented to person, place, and time. He has normal strength and normal reflexes.   Skin: Skin is warm, dry and intact.   Psychiatric: He has a normal mood and affect. His speech is normal and behavior is normal. Judgment and thought content normal. Cognition and memory are normal.     EKG: SR WNL    Assessment and Plan:  Impression:  Hypercholesterolemia.  Labile blood pressure.  Tricuspid regurgitation.  Paroxysmal atrial fibrillation.  Chronic renal insufficiency.  Hypelglycemia.  Recommendation:  He appears to be hemodynamically stable I did not make any changes in medications.  I did talk to him about diet and exercise.  Awaiting new forwarding to me his lab work.  We will see him in 6 months time, if there is a problem we will see him sooner.

## 2020-10-06 DIAGNOSIS — I25.119 CORONARY ARTERY DISEASE WITH ANGINA PECTORIS, UNSPECIFIED VESSEL OR LESION TYPE, UNSPECIFIED WHETHER NATIVE OR TRANSPLANTED HEART (CMS/HCC): ICD-10-CM

## 2020-10-06 DIAGNOSIS — I65.29 OCCLUSION OF CAROTID ARTERY, UNSPECIFIED LATERALITY: Primary | ICD-10-CM

## 2020-10-06 RX ORDER — ATORVASTATIN CALCIUM 40 MG/1
40 TABLET, FILM COATED ORAL DAILY
Qty: 90 TABLET | Refills: 3 | Status: SHIPPED | OUTPATIENT
Start: 2020-10-06 | End: 2021-03-09 | Stop reason: SDUPTHER

## 2020-12-03 ENCOUNTER — HOSPITAL ENCOUNTER (OUTPATIENT)
Dept: CARDIOLOGY | Facility: CLINIC | Age: 80
Discharge: HOME | End: 2020-12-03
Attending: INTERNAL MEDICINE
Payer: MEDICARE

## 2020-12-03 VITALS — WEIGHT: 198 LBS | BODY MASS INDEX: 29.33 KG/M2 | HEIGHT: 69 IN

## 2020-12-03 DIAGNOSIS — I25.119 CORONARY ARTERY DISEASE WITH ANGINA PECTORIS, UNSPECIFIED VESSEL OR LESION TYPE, UNSPECIFIED WHETHER NATIVE OR TRANSPLANTED HEART (CMS/HCC): ICD-10-CM

## 2020-12-03 DIAGNOSIS — I65.29 OCCLUSION OF CAROTID ARTERY, UNSPECIFIED LATERALITY: ICD-10-CM

## 2020-12-03 PROCEDURE — 93880 EXTRACRANIAL BILAT STUDY: CPT | Performed by: INTERNAL MEDICINE

## 2020-12-04 LAB
BSA FOR ECHO PROCEDURE: 2.09 M2
LEFT CCA DIST DIAS: 25.9 CM/S
LEFT CCA DIST SYS: 98.8 CM/S
LEFT CCA MID DIAS: 24.5 CM/S
LEFT CCA MID SYS: 118 CM/S
LEFT CCA PROX DIAS: 30.1 CM/S
LEFT CCA PROX SYS: 125 CM/S
LEFT ICA DIST DIAS: 18.2 CM/S
LEFT ICA DIST SYS: 63.8 CM/S
LEFT ICA MID DIAS: 35 CM/S
LEFT ICA MID SYS: 91.8 CM/S
LEFT ICA PROX DIAS: 30.1 CM/S
LEFT ICA PROX SYS: 77.8 CM/S
LEFT ICA/CCA SYS: 0.93
LT BULB EDV: 18.2 CM/S
LT BULB PSV: 75.7 CM/S
LT ECA PROX EDV: 17.5 CM/S
LT ECA PROX PSV: 95.3 CM/S
LT VERTEBRAL MID EDV: 9.3 CM/S
LT VERTEBRAL MID PSV: 42.5 CM/S
RIGHT CCA DIST DIAS: 16.2 CM/S
RIGHT CCA DIST SYS: 77.7 CM/S
RIGHT CCA MID DIAS: 21.7 CM/S
RIGHT CCA MID SYS: 90.7 CM/S
RIGHT CCA PROX SYS: 103 CM/S
RIGHT ECA SYS: 63.9 CM/S
RIGHT ICA DIST DIAS: 31.3 CM/S
RIGHT ICA DIST SYS: 104 CM/S
RIGHT ICA MID DIAS: 22.5 CM/S
RIGHT ICA MID SYS: 88.4 CM/S
RIGHT ICA PROX DIAS: 17.6 CM/S
RIGHT ICA PROX SYS: 59.6 CM/S
RIGHT ICA/CCA SYS: 1.34
RT BULB EDV: 8.23 CM/S
RT BULB PSV: 39.6 CM/S
RT ECA PROC EDV: 12.2 CM/S
RT VERTEBRAL MID EDV: 14.1 CM/S
RT VERTEBRAL MID PSV: 41.9 CM/S

## 2021-01-14 ENCOUNTER — OFFICE VISIT (OUTPATIENT)
Dept: CARDIOLOGY | Facility: CLINIC | Age: 81
End: 2021-01-14
Payer: MEDICARE

## 2021-01-14 VITALS
SYSTOLIC BLOOD PRESSURE: 114 MMHG | WEIGHT: 216 LBS | HEIGHT: 69 IN | RESPIRATION RATE: 16 BRPM | DIASTOLIC BLOOD PRESSURE: 74 MMHG | HEART RATE: 87 BPM | BODY MASS INDEX: 31.99 KG/M2

## 2021-01-14 DIAGNOSIS — I10 ESSENTIAL HYPERTENSION: Primary | ICD-10-CM

## 2021-01-14 PROCEDURE — 99214 OFFICE O/P EST MOD 30 MIN: CPT | Performed by: INTERNAL MEDICINE

## 2021-01-14 PROCEDURE — 93000 ELECTROCARDIOGRAM COMPLETE: CPT | Performed by: INTERNAL MEDICINE

## 2021-01-14 ASSESSMENT — ENCOUNTER SYMPTOMS
ROS SKIN COMMENTS: ECZEMA
GASTROINTESTINAL NEGATIVE: 1
CARDIOVASCULAR NEGATIVE: 1
CONSTITUTIONAL NEGATIVE: 1
SPUTUM PRODUCTION: 1
EYES NEGATIVE: 1

## 2021-01-14 NOTE — LETTER
January 14, 2021     Armin Aaron Sr., DO  [address] [place] [address]    Patient: Naif De La Garza  YOB: 1940  Date of Visit: 1/14/2021      Dear Dr. Aaron:    Thank you for referring Naif De La Garza to me for evaluation. Below are my notes for this consultation.    If you have questions, please do not hesitate to call me. I look forward to following your patient along with you.         Sincerely,        Andrew Barrow DO        CC: No Recipients  Andrew Barrow DO  1/14/2021  9:08 AM  Signed      Chief Complaint: I saw Naif in the office today on a routine visit for his cholesterol and labile blood pressure.  Baljeet is hemodynamically stable he denies any form of chest discomfort or shortness of breath with exertion, anxiety, cold weather, postprandially, with sex, at rest, or nocturnally.  He denies exertional dyspnea, proximal nocturnal dyspnea, orthopnea, palpitations, syncope, ankle edema, he has nocturia.  His blood pressure today was 136/66 so I asked him now to check his blood pressure daily basis varying times a day take that she continue in a month for you to check his blood pressure and to see what the trend is to see if we need to treat his blood pressure.       Medications:  Current Outpatient Medications   Medication Sig Dispense Refill   • aspirin (ASPIR-LOW) 81 mg enteric coated tablet Take 81 mg by mouth daily.     • atorvastatin (LIPITOR) 40 mg tablet Take 1 tablet (40 mg total) by mouth daily. 90 tablet 3   • triamcinolone (KENALOG) 0.1 % ointment APPLY TO THE AFFECTED AREA(S) TWICE DAILY FOR UP TO TWO WEEKS AS NEEDED FOR eczema  3   • ubidecarenone (CO Q-10 ORAL) Take 1 tablet by mouth daily.     • vitamins  A,C,E-zinc-copper (PRESERVISION AREDS) 14,320-226-200 unit-mg-unit capsule 2 capsules daily       No current facility-administered medications for this visit.        Review of Systems:  Review of Systems   Constitution: Negative.   HENT: Positive for congestion.     Eyes: Negative.    Cardiovascular: Negative.    Respiratory: Positive for sputum production.    Skin: Positive for rash.        eczema   Musculoskeletal: Positive for joint pain.   Gastrointestinal: Negative.    Genitourinary: Positive for nocturia.   Allergic/Immunologic: Positive for environmental allergies.       Physical Exam:  Vitals:    01/14/21 0836   BP: 114/74   Pulse: 87   Resp: 16     Physical Exam   Constitutional: He is oriented to person, place, and time. He appears well-developed and well-nourished.   overweight   HENT:   Head: Normocephalic and atraumatic.   Eyes: Pupils are equal, round, and reactive to light. Conjunctivae and EOM are normal.   Neck: Normal range of motion. Neck supple.   Cardiovascular: Normal rate, regular rhythm and intact distal pulses. Exam reveals gallop (S4).   Pulmonary/Chest: Effort normal and breath sounds normal.   Abdominal: Soft. Bowel sounds are normal.   Musculoskeletal: Normal range of motion.   Neurological: He is alert and oriented to person, place, and time. He has normal strength and normal reflexes.   Skin: Skin is warm, dry and intact.   Psychiatric: He has a normal mood and affect. His speech is normal and behavior is normal. Judgment and thought content normal. Cognition and memory are normal.     EKG: atrial rhythm early transition V2 No change    Assessment and Plan:  Impression:  Labile hypertension.  Hypercholesterolemia.  Overweight.  Recommendation:  We spoke to him at length about diet and exercise he has a plantar fasciitis so is hard for him to exercise so I suggested either he walks in the water if the [employer] is open or use an exercise cycle.  I feel if he is lost 5 to 10 pounds since systolic will be below 130 and we will have to worry but we still have to follow him because he has risk factors of being overweight hyperlipidemic.  We will see him in 4 months time, if there is a problem call me Armin will talk about it.  Thank you for allowing to see  this nice gentleman in the office today.    Andrew Barrow, DO

## 2021-01-14 NOTE — PROGRESS NOTES
Chief Complaint: I saw Naif in the office today on a routine visit for his cholesterol and labile blood pressure.  Baljeet is hemodynamically stable he denies any form of chest discomfort or shortness of breath with exertion, anxiety, cold weather, postprandially, with sex, at rest, or nocturnally.  He denies exertional dyspnea, proximal nocturnal dyspnea, orthopnea, palpitations, syncope, ankle edema, he has nocturia.  His blood pressure today was 136/66 so I asked him now to check his blood pressure daily basis varying times a day take that she continue in a month for you to check his blood pressure and to see what the trend is to see if we need to treat his blood pressure.       Medications:  Current Outpatient Medications   Medication Sig Dispense Refill   • aspirin (ASPIR-LOW) 81 mg enteric coated tablet Take 81 mg by mouth daily.     • atorvastatin (LIPITOR) 40 mg tablet Take 1 tablet (40 mg total) by mouth daily. 90 tablet 3   • triamcinolone (KENALOG) 0.1 % ointment APPLY TO THE AFFECTED AREA(S) TWICE DAILY FOR UP TO TWO WEEKS AS NEEDED FOR eczema  3   • ubidecarenone (CO Q-10 ORAL) Take 1 tablet by mouth daily.     • vitamins  A,C,E-zinc-copper (PRESERVISION AREDS) 14,320-226-200 unit-mg-unit capsule 2 capsules daily       No current facility-administered medications for this visit.        Review of Systems:  Review of Systems   Constitution: Negative.   HENT: Positive for congestion.    Eyes: Negative.    Cardiovascular: Negative.    Respiratory: Positive for sputum production.    Skin: Positive for rash.        eczema   Musculoskeletal: Positive for joint pain.   Gastrointestinal: Negative.    Genitourinary: Positive for nocturia.   Allergic/Immunologic: Positive for environmental allergies.       Physical Exam:  Vitals:    01/14/21 0836   BP: 114/74   Pulse: 87   Resp: 16     Physical Exam   Constitutional: He is oriented to person, place, and time. He appears well-developed and well-nourished.    overweight   HENT:   Head: Normocephalic and atraumatic.   Eyes: Pupils are equal, round, and reactive to light. Conjunctivae and EOM are normal.   Neck: Normal range of motion. Neck supple.   Cardiovascular: Normal rate, regular rhythm and intact distal pulses. Exam reveals gallop (S4).   Pulmonary/Chest: Effort normal and breath sounds normal.   Abdominal: Soft. Bowel sounds are normal.   Musculoskeletal: Normal range of motion.   Neurological: He is alert and oriented to person, place, and time. He has normal strength and normal reflexes.   Skin: Skin is warm, dry and intact.   Psychiatric: He has a normal mood and affect. His speech is normal and behavior is normal. Judgment and thought content normal. Cognition and memory are normal.     EKG: atrial rhythm early transition V2 No change    Assessment and Plan:  Impression:  Labile hypertension.  Hypercholesterolemia.  Overweight.  Recommendation:  We spoke to him at length about diet and exercise he has a plantar fasciitis so is hard for him to exercise so I suggested either he walks in the water if the [employer] is open or use an exercise cycle.  I feel if he is lost 5 to 10 pounds since systolic will be below 130 and we will have to worry but we still have to follow him because he has risk factors of being overweight hyperlipidemic.  We will see him in 4 months time, if there is a problem call me Armin will talk about it.  Thank you for allowing to see this nice gentleman in the office today.    Andrew Barrow,

## 2021-03-10 RX ORDER — ATORVASTATIN CALCIUM 40 MG/1
40 TABLET, FILM COATED ORAL DAILY
Qty: 90 TABLET | Refills: 3 | Status: SHIPPED | OUTPATIENT
Start: 2021-03-10 | End: 2021-03-17 | Stop reason: SDUPTHER

## 2021-03-17 ENCOUNTER — TELEPHONE (OUTPATIENT)
Dept: SCHEDULING | Facility: CLINIC | Age: 81
End: 2021-03-17

## 2021-03-17 RX ORDER — ATORVASTATIN CALCIUM 40 MG/1
40 TABLET, FILM COATED ORAL DAILY
Qty: 91 TABLET | Refills: 3 | Status: SHIPPED | OUTPATIENT
Start: 2021-03-17 | End: 2022-06-16 | Stop reason: SDUPTHER

## 2021-03-17 NOTE — TELEPHONE ENCOUNTER
Medicine Refill Request    Last Office Visit: Visit date not found  Last Telemedicine Visit: Visit date not found    atorvastatin (LIPITOR) 40 mg tablet        Current Outpatient Medications:   •  aspirin (ASPIR-LOW) 81 mg enteric coated tablet, Take 81 mg by mouth daily., Disp: , Rfl:   •  atorvastatin (LIPITOR) 40 mg tablet, Take 1 tablet (40 mg total) by mouth daily., Disp: 90 tablet, Rfl: 3  •  triamcinolone (KENALOG) 0.1 % ointment, APPLY TO THE AFFECTED AREA(S) TWICE DAILY FOR UP TO TWO WEEKS AS NEEDED FOR eczema, Disp: , Rfl: 3  •  ubidecarenone (CO Q-10 ORAL), Take 1 tablet by mouth daily., Disp: , Rfl:   •  vitamins  A,C,E-zinc-copper (PRESERVISION AREDS) 14,320-226-200 unit-mg-unit capsule, 2 capsules daily, Disp: , Rfl:       BP Readings from Last 3 Encounters:   01/14/21 114/74   07/16/20 116/84   12/19/19 112/72       Recent Lab results:  No results found for: CHOL, No results found for: HDL, No results found for: LDLCALC, No results found for: TRIG     No results found for: GLUCOSE, No results found for: HGBA1C      No results found for: CREATININE    No results found for: TSH

## 2021-06-03 ENCOUNTER — OFFICE VISIT (OUTPATIENT)
Dept: CARDIOLOGY | Facility: CLINIC | Age: 81
End: 2021-06-03
Payer: MEDICARE

## 2021-06-03 VITALS
HEIGHT: 69 IN | SYSTOLIC BLOOD PRESSURE: 120 MMHG | BODY MASS INDEX: 32.88 KG/M2 | HEART RATE: 61 BPM | DIASTOLIC BLOOD PRESSURE: 78 MMHG | WEIGHT: 222 LBS | RESPIRATION RATE: 18 BRPM

## 2021-06-03 DIAGNOSIS — I10 ESSENTIAL HYPERTENSION: Primary | ICD-10-CM

## 2021-06-03 DIAGNOSIS — I34.0 NONRHEUMATIC MITRAL VALVE REGURGITATION: ICD-10-CM

## 2021-06-03 PROCEDURE — 93000 ELECTROCARDIOGRAM COMPLETE: CPT | Performed by: INTERNAL MEDICINE

## 2021-06-03 PROCEDURE — G8752 SYS BP LESS 140: HCPCS | Performed by: INTERNAL MEDICINE

## 2021-06-03 PROCEDURE — G8754 DIAS BP LESS 90: HCPCS | Performed by: INTERNAL MEDICINE

## 2021-06-03 PROCEDURE — 99214 OFFICE O/P EST MOD 30 MIN: CPT | Performed by: INTERNAL MEDICINE

## 2021-06-03 ASSESSMENT — ENCOUNTER SYMPTOMS
EYES NEGATIVE: 1
BRUISES/BLEEDS EASILY: 1
PSYCHIATRIC NEGATIVE: 1
CONSTITUTIONAL NEGATIVE: 1
ROS SKIN COMMENTS: ECZEMA
ENDOCRINE NEGATIVE: 1
NEUROLOGICAL NEGATIVE: 1
GASTROINTESTINAL NEGATIVE: 1
CARDIOVASCULAR NEGATIVE: 1
SPUTUM PRODUCTION: 1

## 2021-06-03 NOTE — LETTER
Angelica 3, 2021     Armin Aaron ., DO  [address] [place] [address]    Patient: Naif De La Garza  YOB: 1940  Date of Visit: 6/3/2021      Dear Dr. Aaron:    Thank you for referring Naif De La Garza to me for evaluation. Below are my notes for this consultation.    If you have questions, please do not hesitate to call me. I look forward to following your patient along with you.         Sincerely,        Andrew Barrow DO        CC: No Recipients  Andrew Barrow DO  6/3/2021  8:50 AM  Signed      Chief Complaint: I saw Naif in the office today on a routine visit for his mitral regurgitation and tricuspid regurgitation and atrial fibrillation.  He is hemodynamically stable he denies chest discomfort or shortness of breath with exertion, anxiety, cold weather, postprandially, with sex, at rest, or nocturnally.  He denies exertional dyspnea, proximal nocturnal dyspnea, orthopnea, palpitations, syncope, ankle edema, he has nocturia.  His weight is stable he had to stop exercising secondary to plantar fasciitis.       Medications:  Current Outpatient Medications   Medication Sig Dispense Refill   • aspirin (ASPIR-LOW) 81 mg enteric coated tablet Take 81 mg by mouth daily.     • atorvastatin (LIPITOR) 40 mg tablet Take 1 tablet (40 mg total) by mouth daily. 91 tablet 3   • triamcinolone (KENALOG) 0.1 % ointment APPLY TO THE AFFECTED AREA(S) TWICE DAILY FOR UP TO TWO WEEKS AS NEEDED FOR eczema  3   • ubidecarenone (CO Q-10 ORAL) Take 1 tablet by mouth daily.     • vitamins  A,C,E-zinc-copper (PRESERVISION AREDS) 14,320-226-200 unit-mg-unit capsule 2 capsules daily       No current facility-administered medications for this visit.       Review of Systems:  Review of Systems   Constitutional: Negative.   HENT: Positive for congestion.    Eyes: Negative.    Cardiovascular: Negative.    Respiratory: Positive for sputum production.    Endocrine: Negative.    Hematologic/Lymphatic: Bruises/bleeds easily.    Skin:        eczema   Musculoskeletal: Positive for joint pain.   Gastrointestinal: Negative.    Genitourinary: Positive for nocturia.   Neurological: Negative.    Psychiatric/Behavioral: Negative.    Allergic/Immunologic: Positive for environmental allergies.       Physical Exam:  Vitals:    06/03/21 0824   BP: 120/78   Pulse: 61   Resp: 18     Physical Exam  Constitutional:       Appearance: He is well-developed.   HENT:      Head: Normocephalic and atraumatic.   Eyes:      Conjunctiva/sclera: Conjunctivae normal.      Pupils: Pupils are equal, round, and reactive to light.   Cardiovascular:      Rate and Rhythm: Normal rate and regular rhythm.      Pulses: Intact distal pulses.      Heart sounds: Normal heart sounds.   Pulmonary:      Effort: Pulmonary effort is normal.      Breath sounds: Normal breath sounds.   Abdominal:      General: Bowel sounds are normal.      Palpations: Abdomen is soft.   Musculoskeletal:         General: Normal range of motion.      Cervical back: Normal range of motion and neck supple.   Skin:     General: Skin is warm and dry.   Neurological:      Mental Status: He is alert and oriented to person, place, and time.      Deep Tendon Reflexes: Reflexes are normal and symmetric.   Psychiatric:         Speech: Speech normal.         Behavior: Behavior normal.         Thought Content: Thought content normal.         Judgment: Judgment normal.       EKG: SR WNL  Assessment and Plan:  Impression:  Mitral regurgitation by echocardiogram.  Tricuspid regurgitation by echocardiogram.  Mild atherosclerosis of carotid arteries.  Hypercholesterolemia.  Prediabetes.  Overweight.  Recommendation:  He appears to be hemodynamically stable I did not have to make any changes in his medications.  He remains on his aspirin and atorvastatin to control his cholesterol and prevent events.  We will repeat his echocardiogram to quantitate his mitral regurgitation tricuspid vegetation because we cannot hear it.   We will see him again in 6 months time, we encouraged him to go back to exercising once his foot is better.  Thank you for allowing us to see this very nice gentleman in the office today.    Andrew Barrow, DO

## 2021-06-03 NOTE — LETTER
Angelica 3, 2021     Armin Aaron ., DO  [address] [place] [address]    Patient: Naif De La Garza  YOB: 1940  Date of Visit: 6/3/2021      Dear Dr. Aaron:    Thank you for referring Naif De La Garza to me for evaluation. Below are my notes for this consultation.    If you have questions, please do not hesitate to call me. I look forward to following your patient along with you.         Sincerely,        Andrew Barrow DO        CC: No Recipients  Andrew Barrow DO  6/3/2021  8:50 AM  Sign when Signing Visit      Chief Complaint: I saw Naif in the office today on a routine visit for his mitral regurgitation and tricuspid regurgitation and atrial fibrillation.  He is hemodynamically stable he denies chest discomfort or shortness of breath with exertion, anxiety, cold weather, postprandially, with sex, at rest, or nocturnally.  He denies exertional dyspnea, proximal nocturnal dyspnea, orthopnea, palpitations, syncope, ankle edema, he has nocturia.  His weight is stable he had to stop exercising secondary to plantar fasciitis.       Medications:  Current Outpatient Medications   Medication Sig Dispense Refill   • aspirin (ASPIR-LOW) 81 mg enteric coated tablet Take 81 mg by mouth daily.     • atorvastatin (LIPITOR) 40 mg tablet Take 1 tablet (40 mg total) by mouth daily. 91 tablet 3   • triamcinolone (KENALOG) 0.1 % ointment APPLY TO THE AFFECTED AREA(S) TWICE DAILY FOR UP TO TWO WEEKS AS NEEDED FOR eczema  3   • ubidecarenone (CO Q-10 ORAL) Take 1 tablet by mouth daily.     • vitamins  A,C,E-zinc-copper (PRESERVISION AREDS) 14,320-226-200 unit-mg-unit capsule 2 capsules daily       No current facility-administered medications for this visit.       Review of Systems:  Review of Systems   Constitutional: Negative.   HENT: Positive for congestion.    Eyes: Negative.    Cardiovascular: Negative.    Respiratory: Positive for sputum production.    Endocrine: Negative.    Hematologic/Lymphatic:  Bruises/bleeds easily.   Skin:        eczema   Musculoskeletal: Positive for joint pain.   Gastrointestinal: Negative.    Genitourinary: Positive for nocturia.   Neurological: Negative.    Psychiatric/Behavioral: Negative.    Allergic/Immunologic: Positive for environmental allergies.       Physical Exam:  Vitals:    06/03/21 0824   BP: 120/78   Pulse: 61   Resp: 18     Physical Exam  Constitutional:       Appearance: He is well-developed.   HENT:      Head: Normocephalic and atraumatic.   Eyes:      Conjunctiva/sclera: Conjunctivae normal.      Pupils: Pupils are equal, round, and reactive to light.   Cardiovascular:      Rate and Rhythm: Normal rate and regular rhythm.      Pulses: Intact distal pulses.      Heart sounds: Normal heart sounds.   Pulmonary:      Effort: Pulmonary effort is normal.      Breath sounds: Normal breath sounds.   Abdominal:      General: Bowel sounds are normal.      Palpations: Abdomen is soft.   Musculoskeletal:         General: Normal range of motion.      Cervical back: Normal range of motion and neck supple.   Skin:     General: Skin is warm and dry.   Neurological:      Mental Status: He is alert and oriented to person, place, and time.      Deep Tendon Reflexes: Reflexes are normal and symmetric.   Psychiatric:         Speech: Speech normal.         Behavior: Behavior normal.         Thought Content: Thought content normal.         Judgment: Judgment normal.       EKG: SR WNL  Assessment and Plan:  Impression:  Mitral regurgitation by echocardiogram.  Tricuspid regurgitation by echocardiogram.  Mild atherosclerosis of carotid arteries.  Hypercholesterolemia.  Prediabetes.  Overweight.  Recommendation:  He appears to be hemodynamically stable I did not have to make any changes in his medications.  He remains on his aspirin and atorvastatin to control his cholesterol and prevent events.  We will repeat his echocardiogram to quantitate his mitral regurgitation tricuspid vegetation  because we cannot hear it.  We will see him again in 6 months time, we encouraged him to go back to exercising once his foot is better.  Thank you for allowing us to see this very nice gentleman in the office today.    Andrew Barrow, DO

## 2021-06-03 NOTE — PROGRESS NOTES
Chief Complaint: I saw Naif in the office today on a routine visit for his mitral regurgitation and tricuspid regurgitation and atrial fibrillation.  He is hemodynamically stable he denies chest discomfort or shortness of breath with exertion, anxiety, cold weather, postprandially, with sex, at rest, or nocturnally.  He denies exertional dyspnea, proximal nocturnal dyspnea, orthopnea, palpitations, syncope, ankle edema, he has nocturia.  His weight is stable he had to stop exercising secondary to plantar fasciitis.       Medications:  Current Outpatient Medications   Medication Sig Dispense Refill   • aspirin (ASPIR-LOW) 81 mg enteric coated tablet Take 81 mg by mouth daily.     • atorvastatin (LIPITOR) 40 mg tablet Take 1 tablet (40 mg total) by mouth daily. 91 tablet 3   • triamcinolone (KENALOG) 0.1 % ointment APPLY TO THE AFFECTED AREA(S) TWICE DAILY FOR UP TO TWO WEEKS AS NEEDED FOR eczema  3   • ubidecarenone (CO Q-10 ORAL) Take 1 tablet by mouth daily.     • vitamins  A,C,E-zinc-copper (PRESERVISION AREDS) 14,320-226-200 unit-mg-unit capsule 2 capsules daily       No current facility-administered medications for this visit.       Review of Systems:  Review of Systems   Constitutional: Negative.   HENT: Positive for congestion.    Eyes: Negative.    Cardiovascular: Negative.    Respiratory: Positive for sputum production.    Endocrine: Negative.    Hematologic/Lymphatic: Bruises/bleeds easily.   Skin:        eczema   Musculoskeletal: Positive for joint pain.   Gastrointestinal: Negative.    Genitourinary: Positive for nocturia.   Neurological: Negative.    Psychiatric/Behavioral: Negative.    Allergic/Immunologic: Positive for environmental allergies.       Physical Exam:  Vitals:    06/03/21 0824   BP: 120/78   Pulse: 61   Resp: 18     Physical Exam  Constitutional:       Appearance: He is well-developed.   HENT:      Head: Normocephalic and atraumatic.   Eyes:      Conjunctiva/sclera: Conjunctivae  normal.      Pupils: Pupils are equal, round, and reactive to light.   Cardiovascular:      Rate and Rhythm: Normal rate and regular rhythm.      Pulses: Intact distal pulses.      Heart sounds: Normal heart sounds.   Pulmonary:      Effort: Pulmonary effort is normal.      Breath sounds: Normal breath sounds.   Abdominal:      General: Bowel sounds are normal.      Palpations: Abdomen is soft.   Musculoskeletal:         General: Normal range of motion.      Cervical back: Normal range of motion and neck supple.   Skin:     General: Skin is warm and dry.   Neurological:      Mental Status: He is alert and oriented to person, place, and time.      Deep Tendon Reflexes: Reflexes are normal and symmetric.   Psychiatric:         Speech: Speech normal.         Behavior: Behavior normal.         Thought Content: Thought content normal.         Judgment: Judgment normal.       EKG: SR WNL  Assessment and Plan:  Impression:  Mitral regurgitation by echocardiogram.  Tricuspid regurgitation by echocardiogram.  Mild atherosclerosis of carotid arteries.  Hypercholesterolemia.  Prediabetes.  Overweight.  Recommendation:  He appears to be hemodynamically stable I did not have to make any changes in his medications.  He remains on his aspirin and atorvastatin to control his cholesterol and prevent events.  We will repeat his echocardiogram to quantitate his mitral regurgitation tricuspid vegetation because we cannot hear it.  We will see him again in 6 months time, we encouraged him to go back to exercising once his foot is better.  Thank you for allowing us to see this very nice gentleman in the office today.    Andrew Barrow,

## 2021-06-30 ENCOUNTER — HOSPITAL ENCOUNTER (OUTPATIENT)
Dept: CARDIOLOGY | Facility: CLINIC | Age: 81
Discharge: HOME | End: 2021-06-30
Attending: INTERNAL MEDICINE
Payer: MEDICARE

## 2021-06-30 VITALS
BODY MASS INDEX: 32.88 KG/M2 | SYSTOLIC BLOOD PRESSURE: 120 MMHG | DIASTOLIC BLOOD PRESSURE: 78 MMHG | WEIGHT: 222 LBS | HEIGHT: 69 IN

## 2021-06-30 DIAGNOSIS — I71.20 THORACIC AORTIC ANEURYSM WITHOUT RUPTURE (CMS/HCC): Primary | ICD-10-CM

## 2021-06-30 LAB
AORTIC ROOT ANNULUS: 4.1 CM
ASCENDING AORTA: 4.1 CM
AV PEAK GRADIENT: 6 MMHG
AV PEAK VELOCITY-S: 1.22 M/S
AV VALVE AREA: 2.85 CM2
BSA FOR ECHO PROCEDURE: 2.21 M2
CUSP SEPARATION: 2.1 CM
E/A RATIO: 0.7
E/E' RATIO: 7.5
E/LAT E' RATIO: 6.9
EDV (BP): 56.3 CM3
EF (A4C): 65.1 %
EF A2C: 61.9 %
EJECTION FRACTION: 64.3 %
EST RIGHT VENT SYSTOLIC PRESSURE BY TRICUSPID REGURGITATION JET: 26 MMHG
ESV (BP): 20.1 CM3
FRACTIONAL SHORTENING: 32.6 %
INTERVENTRICULAR SEPTUM: 1.3 CM
LA ESV (BP): 87.4 CM3
LA ESV INDEX (A2C): 30.54 CM3/M2
LA ESV INDEX (BP): 39.55 CM3/M2
LA/AORTA RATIO: 1.2
LAAS-AP2: 23.3 CM2
LAAS-AP4: 30.7 CM2
LAD 2D: 4.9 CM
LALD A4C: 6.37 CM
LALD A4C: 6.66 CM
LAV-S: 67.5 CM3
LEFT ATRIUM VOLUME INDEX: 51.13 CM3/M2
LEFT ATRIUM VOLUME: 113 CM3
LEFT INTERNAL DIMENSION IN SYSTOLE: 2.56 CM (ref 3.5–5.31)
LEFT VENTRICLE DIASTOLIC VOLUME INDEX: 27.87 CM3/M2
LEFT VENTRICLE DIASTOLIC VOLUME: 61.6 CM3
LEFT VENTRICLE SYSTOLIC VOLUME INDEX: 9.73 CM3/M2
LEFT VENTRICLE SYSTOLIC VOLUME: 21.5 CM3
LEFT VENTRICULAR INTERNAL DIMENSION IN DIASTOLE: 3.8 CM (ref 6.01–8.34)
LEFT VENTRICULAR POSTERIOR WALL IN END DIASTOLE: 1.35 CM (ref 0.75–1.4)
LV DIASTOLIC VOLUME: 49.6 CM3
LV ESV (APICAL 2 CHAMBER): 18.9 CM3
LVAD-AP2: 20.6 CM2
LVAD-AP4: 23.8 CM2
LVAS-AP2: 11.9 CM2
LVAS-AP4: 12.9 CM2
LVEDVI(A2C): 22.44 CM3/M2
LVEDVI(BP): 25.48 CM3/M2
LVESVI(A2C): 8.55 CM3/M2
LVESVI(BP): 9.1 CM3/M2
LVLD-AP2: 7.18 CM
LVLD-AP4: 7.48 CM
LVLS-AP2: 6.53 CM
LVLS-AP4: 6.5 CM
LVOT 2D: 2.3 CM
LVOT A: 4.15 CM2
LVOT PEAK VELOCITY: 0.84 M/S
LVOT PG: 3 MMHG
MV E'TISSUE VEL-LAT: 0.09 M/S
MV E'TISSUE VEL-MED: 0.08 M/S
MV PEAK A VEL: 0.84 M/S
MV PEAK E VEL: 0.62 M/S
MV VALVE AREA P 1/2 METHOD: 2.68 CM2
POSTERIOR WALL: 1.35 CM
PV MEAN GRADIENT: 4 MMHG
PV MEAN VELOCITY: 0.9 M/S
PV PEAK GRADIENT: 10 MMHG
PV PV: 1.56 M/S
RAP: 5 MMHG
RVOT VMAX: 0.86 M/S
RVOT VTI: 17.8 CM
SEPTAL TISSUE DOPPLER FREE WALL LATE DIA VELOCITY (APICAL 4 CHAMBER VIEW): 0.18 M/S
TR MAX PG: 21 MMHG
TRICUSPID VALVE PEAK REGURGITATION VELOCITY: 2.28 M/S
Z-SCORE OF LEFT VENTRICULAR DIMENSION IN END DIASTOLE: -6.22
Z-SCORE OF LEFT VENTRICULAR DIMENSION IN END SYSTOLE: -4.12
Z-SCORE OF LEFT VENTRICULAR POSTERIOR WALL IN END DIASTOLE: 1.46

## 2021-06-30 PROCEDURE — 93306 TTE W/DOPPLER COMPLETE: CPT | Performed by: INTERNAL MEDICINE

## 2021-07-06 ENCOUNTER — TELEPHONE (OUTPATIENT)
Dept: SCHEDULING | Facility: CLINIC | Age: 81
End: 2021-07-06

## 2021-07-06 NOTE — TELEPHONE ENCOUNTER
S/w pt  States he had labwork done on 6/21/21 at [facility]  Can you ladies print and scan so radiology can view?  Need asap; CTA tomorrow. Ty!

## 2021-07-06 NOTE — TELEPHONE ENCOUNTER
Esme, Radiology nurse calling from [facility] looking for BUN & Creatinine results for CTA scheduled for tomorrow.      Esme can be reached at [phone]

## 2021-07-07 ENCOUNTER — TELEPHONE (OUTPATIENT)
Dept: CARDIOTHORACIC SURGERY | Facility: CLINIC | Age: 81
End: 2021-07-07

## 2021-07-07 ENCOUNTER — HOSPITAL ENCOUNTER (OUTPATIENT)
Dept: RADIOLOGY | Facility: HOSPITAL | Age: 81
Discharge: HOME | End: 2021-07-07
Attending: INTERNAL MEDICINE
Payer: MEDICARE

## 2021-07-07 DIAGNOSIS — I71.20 THORACIC AORTIC ANEURYSM WITHOUT RUPTURE (CMS/HCC): ICD-10-CM

## 2021-07-07 PROCEDURE — 71275 CT ANGIOGRAPHY CHEST: CPT | Mod: MG

## 2021-07-07 PROCEDURE — 63600105 HC IODINE BASED CONTRAST: Performed by: INTERNAL MEDICINE

## 2021-07-07 RX ADMIN — IOHEXOL 100 ML: 350 INJECTION, SOLUTION INTRAVENOUS at 09:25

## 2021-07-07 NOTE — TELEPHONE ENCOUNTER
Patient transferred from [facility] to schedule an appt with [facility]. He states based off CTA chest results, Dr Barrow told him to call our office. Please advise.

## 2021-07-08 NOTE — TELEPHONE ENCOUNTER
Called spoke to pt. Scheduled to see Dr Phan on Thur 8/15 at 1pm    Scanned consult form into MM . Not enough time to mail NPP

## 2021-07-14 NOTE — PROGRESS NOTES
[facility]  Dr. Jose Phan- System Chief Cardiothoracic Surgery  [facility]  [phone]     Reason for visit:ascending aortic aneurysm    Referring Provider: Cardiologist: Andrew Barrow DO  PCP: Armin Aaron Sr., DO   HPI    Naif De La Garza is a 80 y.o. male with PMHx CAD, HLD, and hx of colon cancer s/p surgery who presents to Dr. Phan for initial evaluation of ascending aortic aneurysm. Recent CTA chest on 7/7/21 revealed 4.1 cm ascending aortic aneurysm by review of Vitrea 3D imaging. TTE on 6/30/21 shows tricuspid aortic valve with mild AI. Pt reports his blood pressure at home ranges from 130-140/70-79 and he is not taking anti-hypertensive medication. He follows with Cardiologist, Dr. Barrow. Pt denies any chest pain, back pain, increasing SOB, syncope, pre-syncope. Pt reports BP has been well controlled. He is a former smoker, quit 1985. He is a retired . He reports  family history of CAD- brother and sister and his father had an abdominal aneurysm. He is active with mowing the lawn and occasionally goes to the gym.     Pertinent testing summarized below:    CTA Chest 7/7/21:   Ascending aortic aneurysm measuring up to 4.4 cm at the level of the right pulmonary artery not significantly changed when measured on prior study from 11/14/2017, given for differences in technique.  The descending aorta at this level measures 2.7 mm.. There is no evidence of dissection or intramural hematoma.  There is mild atherosclerotic calcification.       Multiple pulmonary nodules, as detailed above.  These are not significantly  changed compared to prior study however continued follow-up per Fleischner  criteria as recommended.    TTE 6/30/21:  · Normal-sized LV. Mild concentric left ventricular hypertrophy.  · Normal LV systolic function. Estimated EF 65%. Normal LV septal wall motion. No regional wall motion abnormalities. Grade I LV diastolic dysfunction. LV  diastolic inflow pattern consistent with impaired relaxation.  · Normal-sized RV. Normal RV systolic function.  · Moderately dilated LA.. LA septal aneurysm present.  · Aortic root calcificied. Sinuses of Valsalva calcified. Ascending aorta sclerotic. Dilatation of the aorta at the sinuses of Valsalva. Aortic aneurysm present in the aortic root (sinus level).  · Tricuspid aortic valve. Sclerotic aortic valve leaflets. Mild aortic valve regurgitation with an eccentrically directed jet.  · Grossly normal leaflet structure of the mitral valve. Mild mitral annular calcification.  · Mild mitral valve regurgitation. The jet is centrally directed.  · Tricuspid valve structure is grossly normal. Mild tricuspid valve regurgitation. The regurgitation jet is central.  · Estimated RVSP = 26 mmHg.       Past Medical History:   Diagnosis Date   • Aortic aneurysm (CMS/HCC)    • Coronary artery disease    • History of colon cancer    • Hyperlipidemia    • Lipid disorder      Past Surgical History:   Procedure Laterality Date   • BUNIONECTOMY  2009   • COLON SURGERY       No known allergies  Current Outpatient Medications   Medication Sig Dispense Refill   • aspirin (ASPIR-LOW) 81 mg enteric coated tablet Take 81 mg by mouth daily.     • atorvastatin (LIPITOR) 40 mg tablet Take 1 tablet (40 mg total) by mouth daily. 91 tablet 3   • ubidecarenone (CO Q-10 ORAL) Take 1 tablet by mouth daily.     • vitamins  A,C,E-zinc-copper (PRESERVISION AREDS) 14,320-226-200 unit-mg-unit capsule 2 capsules daily     • triamcinolone (KENALOG) 0.1 % ointment APPLY TO THE AFFECTED AREA(S) TWICE DAILY FOR UP TO TWO WEEKS AS NEEDED FOR eczema  3     No current facility-administered medications for this visit.     Social History     Socioeconomic History   • Marital status:      Spouse name: None   • Number of children: None   • Years of education: None   • Highest education level: None   Occupational History   • None   Tobacco Use   • Smoking  status: Former Smoker     Packs/day: 1.00     Types: Cigarettes     Quit date:      Years since quittin.5   • Smokeless tobacco: Never Used   Vaping Use   • Vaping Use: Never used   Substance and Sexual Activity   • Alcohol use: Yes     Comment: occasionally   • Drug use: No   • Sexual activity: Defer   Other Topics Concern   • None   Social History Narrative   • None     Social Determinants of Health     Financial Resource Strain:    • Difficulty of Paying Living Expenses:    Food Insecurity:    • Worried About Running Out of Food in the Last Year:    • Ran Out of Food in the Last Year:    Transportation Needs:    • Lack of Transportation (Medical):    • Lack of Transportation (Non-Medical):    Physical Activity:    • Days of Exercise per Week:    • Minutes of Exercise per Session:    Stress:    • Feeling of Stress :    Social Connections:    • Frequency of Communication with Friends and Family:    • Frequency of Social Gatherings with Friends and Family:    • Attends Yazdanism Services:    • Active Member of Clubs or Organizations:    • Attends Club or Organization Meetings:    • Marital Status:    Intimate Partner Violence:    • Fear of Current or Ex-Partner:    • Emotionally Abused:    • Physically Abused:    • Sexually Abused:      Family History   Problem Relation Age of Onset   • Breast cancer Biological Mother         Current Outpatient Medications:   •  aspirin (ASPIR-LOW) 81 mg enteric coated tablet, Take 81 mg by mouth daily., Disp: , Rfl:   •  atorvastatin (LIPITOR) 40 mg tablet, Take 1 tablet (40 mg total) by mouth daily., Disp: 91 tablet, Rfl: 3  •  ubidecarenone (CO Q-10 ORAL), Take 1 tablet by mouth daily., Disp: , Rfl:   •  vitamins  A,C,E-zinc-copper (PRESERVISION AREDS) 14,320226-200 unit-mg-unit capsule, 2 capsules daily, Disp: , Rfl:   •  triamcinolone (KENALOG) 0.1 % ointment, APPLY TO THE AFFECTED AREA(S) TWICE DAILY FOR UP TO TWO WEEKS AS NEEDED FOR eczema, Disp: , Rfl: 3    Review  of Systems   Constitutional: Negative.   HENT: Negative.    Eyes: Negative.    Cardiovascular: Negative.  Negative for chest pain, irregular heartbeat, leg swelling, near-syncope, palpitations and syncope.   Respiratory: Negative.  Negative for shortness of breath.    Endocrine: Negative.    Hematologic/Lymphatic: Negative.    Skin: Negative.    Musculoskeletal: Negative.    Gastrointestinal: Negative.    Neurological: Negative.    Psychiatric/Behavioral: Negative.       Objective    Vitals:    07/15/21 1259   BP: 134/84   Pulse: 88   Resp: 16   Temp: (!) 36.1 °C (96.9 °F)   SpO2: 96%      Physical Exam  Constitutional:       General: He is not in acute distress.     Appearance: Normal appearance. He is normal weight.   HENT:      Head: Normocephalic and atraumatic.      Mouth/Throat:      Mouth: Mucous membranes are moist.   Eyes:      Extraocular Movements: Extraocular movements intact.      Conjunctiva/sclera: Conjunctivae normal.   Cardiovascular:      Rate and Rhythm: Normal rate and regular rhythm.      Heart sounds: Normal heart sounds. No murmur heard.     Pulmonary:      Effort: Pulmonary effort is normal. No respiratory distress.      Breath sounds: Normal breath sounds.   Musculoskeletal:         General: Normal range of motion.      Cervical back: Normal range of motion.      Right lower leg: No edema.      Left lower leg: No edema.   Skin:     General: Skin is warm and dry.   Neurological:      General: No focal deficit present.      Mental Status: He is alert and oriented to person, place, and time.   Psychiatric:         Mood and Affect: Mood normal.         Behavior: Behavior normal.           Imaging  See HPI for pertinent  All imaging reviewed by myself and Dr. Phan     Assessment/Plan      Assessment/Plan     Assessment: Thoracic Aortic Aneurysm  Naif De La Garza is a 80 y.o. male seen today in consultation for ascending aortic aneurysm. CT with contrast completed on 7/7/21 was reviewed by   Emilie and myself. Ascending aorta measures 4.1cm max on vitrea 3D imaging.  The patient remains asymptomatic at this time. He currently denies chest pain, shortness of breath, dyspnea with exertion, fatigue, lightheadedness, dizziness, and syncope.     Plan:  Surgical intervention is not warranted at this time. Dr. Phan recommends the patient return to our [facility] in 1 year for continued surveillance with CTA Chest.    Naif De La Garza was educated on proper aortic precautions to take from this point forward. He was asked to maintain adequate blood pressure control (with systolic <140) and to avoid lifting anything in excess of 45 lbs.  Additionally, the patient was asked to continue to refrain from smoking as this could exacerbate their current condition.  Lastly, in the unlikely event that the patient experience severe tearing/ripping chest or back pain, he was instructed to go to the nearest emergency department immediately and inform the team that they are under Dr. Phan’s care.     All questions were answered and the patient verbalized understanding. Naif De La Garza was encouraged to call us in the interim with any additional questions or concerns.        I, LYSSA Verdugo, am scribing for and in the presence of Dr. Jose Phan.    Thank you for the opportunity participate in this patient's care.  Please call [phone] with any questions or concerns.     LYSSA Verdugo 7/15/2021  1:46 PM

## 2021-07-15 ENCOUNTER — CONSULT (OUTPATIENT)
Dept: CARDIOTHORACIC SURGERY | Facility: CLINIC | Age: 81
End: 2021-07-15
Payer: MEDICARE

## 2021-07-15 VITALS
RESPIRATION RATE: 16 BRPM | WEIGHT: 220 LBS | SYSTOLIC BLOOD PRESSURE: 134 MMHG | BODY MASS INDEX: 32.58 KG/M2 | HEART RATE: 88 BPM | OXYGEN SATURATION: 96 % | DIASTOLIC BLOOD PRESSURE: 84 MMHG | HEIGHT: 69 IN | TEMPERATURE: 96.9 F

## 2021-07-15 DIAGNOSIS — Z98.890 STATUS POST THORACIC AORTIC ANEURYSM REPAIR: ICD-10-CM

## 2021-07-15 DIAGNOSIS — Z86.79 STATUS POST THORACIC AORTIC ANEURYSM REPAIR: ICD-10-CM

## 2021-07-15 DIAGNOSIS — I71.20 THORACIC AORTIC ANEURYSM WITHOUT RUPTURE (CMS/HCC): Primary | ICD-10-CM

## 2021-07-15 PROCEDURE — G8752 SYS BP LESS 140: HCPCS | Performed by: THORACIC SURGERY (CARDIOTHORACIC VASCULAR SURGERY)

## 2021-07-15 PROCEDURE — G8754 DIAS BP LESS 90: HCPCS | Performed by: THORACIC SURGERY (CARDIOTHORACIC VASCULAR SURGERY)

## 2021-07-15 PROCEDURE — 99205 OFFICE O/P NEW HI 60 MIN: CPT | Performed by: THORACIC SURGERY (CARDIOTHORACIC VASCULAR SURGERY)

## 2021-07-15 ASSESSMENT — ENCOUNTER SYMPTOMS
CARDIOVASCULAR NEGATIVE: 1
HEMATOLOGIC/LYMPHATIC NEGATIVE: 1
NEUROLOGICAL NEGATIVE: 1
NEAR-SYNCOPE: 0
CONSTITUTIONAL NEGATIVE: 1
SYNCOPE: 0
GASTROINTESTINAL NEGATIVE: 1
IRREGULAR HEARTBEAT: 0
PSYCHIATRIC NEGATIVE: 1
MUSCULOSKELETAL NEGATIVE: 1
EYES NEGATIVE: 1
ENDOCRINE NEGATIVE: 1
SHORTNESS OF BREATH: 0
PALPITATIONS: 0
RESPIRATORY NEGATIVE: 1

## 2021-12-16 ENCOUNTER — OFFICE VISIT (OUTPATIENT)
Dept: CARDIOLOGY | Facility: CLINIC | Age: 81
End: 2021-12-16
Payer: MEDICARE

## 2021-12-16 VITALS
RESPIRATION RATE: 16 BRPM | SYSTOLIC BLOOD PRESSURE: 112 MMHG | HEIGHT: 69 IN | BODY MASS INDEX: 32.14 KG/M2 | HEART RATE: 92 BPM | DIASTOLIC BLOOD PRESSURE: 62 MMHG | WEIGHT: 217 LBS

## 2021-12-16 DIAGNOSIS — R07.89 OTHER CHEST PAIN: ICD-10-CM

## 2021-12-16 DIAGNOSIS — R53.83 OTHER FATIGUE: ICD-10-CM

## 2021-12-16 DIAGNOSIS — I10 PRIMARY HYPERTENSION: Primary | ICD-10-CM

## 2021-12-16 PROCEDURE — 93000 ELECTROCARDIOGRAM COMPLETE: CPT | Performed by: INTERNAL MEDICINE

## 2021-12-16 PROCEDURE — G8752 SYS BP LESS 140: HCPCS | Performed by: INTERNAL MEDICINE

## 2021-12-16 PROCEDURE — 99214 OFFICE O/P EST MOD 30 MIN: CPT | Performed by: INTERNAL MEDICINE

## 2021-12-16 PROCEDURE — G8754 DIAS BP LESS 90: HCPCS | Performed by: INTERNAL MEDICINE

## 2021-12-16 ASSESSMENT — ENCOUNTER SYMPTOMS
WEIGHT GAIN: 1
MUSCULOSKELETAL NEGATIVE: 1
RESPIRATORY NEGATIVE: 1
ALLERGIC/IMMUNOLOGIC NEGATIVE: 1
HEMATOLOGIC/LYMPHATIC NEGATIVE: 1
GASTROINTESTINAL NEGATIVE: 1
EYES NEGATIVE: 1
PSYCHIATRIC NEGATIVE: 1
NEUROLOGICAL NEGATIVE: 1
ENDOCRINE NEGATIVE: 1

## 2021-12-16 NOTE — PROGRESS NOTES
Chief Complaint: I saw Naif in the office today on a routine visit for his hypercholesterolemia and obesity.  Juanita states that he has noticed some shortness of breath climbing the stairs after doing some yard work.  He can climb them straight without doing a lot of yard work and no symptoms but after he is done some sustained exercise he is fatigued and short of breath climbing stairs.  He denies chest discomfort or shortness of breath with anxiety cold weather postprandially with sex at rest or nocturnally.  He denies exertional dyspnea, proximal nocturnal dyspnea, orthopnea, palpitations, syncope, ankle edema, he has nocturia.       Medications:  Current Outpatient Medications   Medication Sig Dispense Refill   • aspirin (ASPIR-LOW) 81 mg enteric coated tablet Take 81 mg by mouth daily.     • atorvastatin (LIPITOR) 40 mg tablet Take 1 tablet (40 mg total) by mouth daily. 91 tablet 3   • triamcinolone (KENALOG) 0.1 % ointment APPLY TO THE AFFECTED AREA(S) TWICE DAILY FOR UP TO TWO WEEKS AS NEEDED FOR eczema  3   • ubidecarenone (CO Q-10 ORAL) Take 1 tablet by mouth daily.     • vitamins  A,C,E-zinc-copper (PRESERVISION AREDS) 14,320-226-200 unit-mg-unit capsule 2 capsules daily       No current facility-administered medications for this visit.       Review of Systems:  Review of Systems   Constitutional: Positive for weight gain.   HENT: Negative.    Eyes: Negative.    Cardiovascular: Positive for chest pain.   Respiratory: Negative.    Endocrine: Negative.    Hematologic/Lymphatic: Negative.    Skin: Negative.    Musculoskeletal: Negative.    Gastrointestinal: Negative.    Genitourinary: Positive for nocturia.   Neurological: Negative.    Psychiatric/Behavioral: Negative.    Allergic/Immunologic: Negative.        Physical Exam:  Vitals:    12/16/21 0757   BP: 112/62   Pulse: 92   Resp: 16     Physical Exam  Constitutional:       Appearance: He is well-developed.   HENT:      Head: Normocephalic and  atraumatic.   Eyes:      Conjunctiva/sclera: Conjunctivae normal.      Pupils: Pupils are equal, round, and reactive to light.   Cardiovascular:      Rate and Rhythm: Normal rate and regular rhythm.      Pulses: Intact distal pulses.      Heart sounds: Normal heart sounds.   Pulmonary:      Effort: Pulmonary effort is normal.      Breath sounds: Normal breath sounds.   Abdominal:      General: Bowel sounds are normal.      Palpations: Abdomen is soft.   Musculoskeletal:         General: Normal range of motion.      Cervical back: Normal range of motion and neck supple.   Skin:     General: Skin is warm and dry.   Neurological:      Mental Status: He is alert and oriented to person, place, and time.      Deep Tendon Reflexes: Reflexes are normal and symmetric.   Psychiatric:         Speech: Speech normal.         Behavior: Behavior normal.         Thought Content: Thought content normal.         Judgment: Judgment normal.       EKG:SR WNL changes in lead placement    Assessment and Plan:  Impression:  Exertional dyspnea rule out ischemic heart disease or anginal equivalent.  Hypercholesterolemia.  Overweight.  Excigarette smoker.  Recommendation:  In light of his mild change in his symptomatology and the fact he is going to go away for an extended period of time over the winter I set him up for standard stress test to rule out ischemic heart disease as to the etiology of this dyspnea and fatigue.  We will forward the results to you.  Thank you for allowing us see this very nice gentleman in the office today.  If the study is normal we will see him in 6 months time.    Andrew Barrow, DO

## 2021-12-16 NOTE — LETTER
December 16, 2021     Armin Aaron Sr., DO  [address] [facility] [address]    Patient: Naif De La Garza  YOB: 1940  Date of Visit: 12/16/2021      Dear Dr. Aaron:    Thank you for referring Naif De La Garza to me for evaluation. Below are my notes for this consultation.    If you have questions, please do not hesitate to call me. I look forward to following your patient along with you.         Sincerely,        Andrew Barrow DO        CC: No Recipients  Andrew Barrow DO  12/16/2021  8:48 AM  Signed      Chief Complaint: I saw Naif in the office today on a routine visit for his hypercholesterolemia and obesity.  Juanita states that he has noticed some shortness of breath climbing the stairs after doing some yard work.  He can climb them straight without doing a lot of yard work and no symptoms but after he is done some sustained exercise he is fatigued and short of breath climbing stairs.  He denies chest discomfort or shortness of breath with anxiety cold weather postprandially with sex at rest or nocturnally.  He denies exertional dyspnea, proximal nocturnal dyspnea, orthopnea, palpitations, syncope, ankle edema, he has nocturia.       Medications:  Current Outpatient Medications   Medication Sig Dispense Refill   • aspirin (ASPIR-LOW) 81 mg enteric coated tablet Take 81 mg by mouth daily.     • atorvastatin (LIPITOR) 40 mg tablet Take 1 tablet (40 mg total) by mouth daily. 91 tablet 3   • triamcinolone (KENALOG) 0.1 % ointment APPLY TO THE AFFECTED AREA(S) TWICE DAILY FOR UP TO TWO WEEKS AS NEEDED FOR eczema  3   • ubidecarenone (CO Q-10 ORAL) Take 1 tablet by mouth daily.     • vitamins  A,C,E-zinc-copper (PRESERVISION AREDS) 14,320-226-200 unit-mg-unit capsule 2 capsules daily       No current facility-administered medications for this visit.       Review of Systems:  Review of Systems   Constitutional: Positive for weight gain.   HENT: Negative.    Eyes: Negative.     Cardiovascular: Positive for chest pain.   Respiratory: Negative.    Endocrine: Negative.    Hematologic/Lymphatic: Negative.    Skin: Negative.    Musculoskeletal: Negative.    Gastrointestinal: Negative.    Genitourinary: Positive for nocturia.   Neurological: Negative.    Psychiatric/Behavioral: Negative.    Allergic/Immunologic: Negative.        Physical Exam:  Vitals:    12/16/21 0757   BP: 112/62   Pulse: 92   Resp: 16     Physical Exam  Constitutional:       Appearance: He is well-developed.   HENT:      Head: Normocephalic and atraumatic.   Eyes:      Conjunctiva/sclera: Conjunctivae normal.      Pupils: Pupils are equal, round, and reactive to light.   Cardiovascular:      Rate and Rhythm: Normal rate and regular rhythm.      Pulses: Intact distal pulses.      Heart sounds: Normal heart sounds.   Pulmonary:      Effort: Pulmonary effort is normal.      Breath sounds: Normal breath sounds.   Abdominal:      General: Bowel sounds are normal.      Palpations: Abdomen is soft.   Musculoskeletal:         General: Normal range of motion.      Cervical back: Normal range of motion and neck supple.   Skin:     General: Skin is warm and dry.   Neurological:      Mental Status: He is alert and oriented to person, place, and time.      Deep Tendon Reflexes: Reflexes are normal and symmetric.   Psychiatric:         Speech: Speech normal.         Behavior: Behavior normal.         Thought Content: Thought content normal.         Judgment: Judgment normal.       EKG:SR WNL changes in lead placement    Assessment and Plan:  Impression:  Exertional dyspnea rule out ischemic heart disease or anginal equivalent.  Hypercholesterolemia.  Overweight.  Excigarette smoker.  Recommendation:  In light of his mild change in his symptomatology and the fact he is going to go away for an extended period of time over the winter I set him up for standard stress test to rule out ischemic heart disease as to the etiology of this dyspnea  and fatigue.  We will forward the results to you.  Thank you for allowing us see this very nice gentleman in the office today.  If the study is normal we will see him in 6 months time.    Andrew Barrow, DO

## 2021-12-23 ENCOUNTER — HOSPITAL ENCOUNTER (OUTPATIENT)
Dept: CARDIOLOGY | Facility: CLINIC | Age: 81
Discharge: HOME | End: 2021-12-23
Attending: INTERNAL MEDICINE
Payer: MEDICARE

## 2021-12-23 VITALS — SYSTOLIC BLOOD PRESSURE: 138 MMHG | HEART RATE: 95 BPM | DIASTOLIC BLOOD PRESSURE: 78 MMHG

## 2021-12-23 LAB
STRESS ANGINA INDEX: 0
STRESS BASELINE BP: NORMAL MMHG
STRESS BASELINE HR: 87 BPM
STRESS ECHO POST RECOVERY HR: 122 BPM
STRESS PERCENT HR: 92 %
STRESS POST ESTIMATED WORKLOAD: 7 METS
STRESS POST EXERCISE DUR MIN: 4 MIN
STRESS POST PEAK BP: NORMAL MMHG
STRESS POST PEAK HR: 130 BPM
STRESS TARGET HR: 120 BPM

## 2021-12-23 PROCEDURE — 93015 CV STRESS TEST SUPVJ I&R: CPT | Performed by: INTERNAL MEDICINE

## 2022-06-16 ENCOUNTER — OFFICE VISIT (OUTPATIENT)
Dept: CARDIOLOGY | Facility: CLINIC | Age: 82
End: 2022-06-16
Payer: MEDICARE

## 2022-06-16 VITALS
SYSTOLIC BLOOD PRESSURE: 120 MMHG | BODY MASS INDEX: 31.99 KG/M2 | DIASTOLIC BLOOD PRESSURE: 74 MMHG | RESPIRATION RATE: 18 BRPM | HEIGHT: 69 IN | HEART RATE: 84 BPM | WEIGHT: 216 LBS

## 2022-06-16 DIAGNOSIS — I10 PRIMARY HYPERTENSION: Primary | ICD-10-CM

## 2022-06-16 DIAGNOSIS — I35.1 NONRHEUMATIC AORTIC VALVE INSUFFICIENCY: ICD-10-CM

## 2022-06-16 PROCEDURE — 99214 OFFICE O/P EST MOD 30 MIN: CPT | Performed by: INTERNAL MEDICINE

## 2022-06-16 PROCEDURE — G8752 SYS BP LESS 140: HCPCS | Performed by: INTERNAL MEDICINE

## 2022-06-16 PROCEDURE — 93000 ELECTROCARDIOGRAM COMPLETE: CPT | Performed by: INTERNAL MEDICINE

## 2022-06-16 PROCEDURE — G8754 DIAS BP LESS 90: HCPCS | Performed by: INTERNAL MEDICINE

## 2022-06-16 RX ORDER — ATORVASTATIN CALCIUM 40 MG/1
40 TABLET, FILM COATED ORAL DAILY
Qty: 91 TABLET | Refills: 3 | Status: SHIPPED | OUTPATIENT
Start: 2022-06-16 | End: 2023-01-19 | Stop reason: SDUPTHER

## 2022-06-16 ASSESSMENT — ENCOUNTER SYMPTOMS
NEUROLOGICAL NEGATIVE: 1
DYSPNEA ON EXERTION: 1
MYALGIAS: 1
SPUTUM PRODUCTION: 1
ENDOCRINE NEGATIVE: 1
EYES NEGATIVE: 1
BRUISES/BLEEDS EASILY: 1
PSYCHIATRIC NEGATIVE: 1
WEIGHT GAIN: 1
GASTROINTESTINAL NEGATIVE: 1

## 2022-06-16 NOTE — LETTER
June 16, 2022     Armin Aaron Sr., DO  [address] [facility] [address]    Patient: Naif De La Garza  YOB: 1940  Date of Visit: 6/16/2022      Dear Dr. Aaron:    Thank you for referring Naif De La Garza to me for evaluation. Below are my notes for this consultation.    If you have questions, please do not hesitate to call me. I look forward to following your patient along with you.         Sincerely,        Andrew Barrow DO        CC: No Recipients  Andrew Barrow DO  6/16/2022  8:48 AM  Signed      Chief Complaint: I saw Naif today visit for his aortic regurgitation and mitral regurgitation and thoracic aortic aneurysm.  Impression he denies any form of chest discomfort or shortness of breath with exertion, anxiety, cold weather, postprandially, with sex, at rest, or nocturnally.  He denies exertional dyspnea, proximal nocturnal dyspnea, orthopnea, palpitations, syncope, ankle edema, he has nocturia.       Medications:  Current Outpatient Medications   Medication Sig Dispense Refill   • aspirin 81 mg enteric coated tablet Take 81 mg by mouth daily.     • atorvastatin (LIPITOR) 40 mg tablet Take 1 tablet (40 mg total) by mouth daily. 91 tablet 3   • triamcinolone (KENALOG) 0.1 % ointment APPLY TO THE AFFECTED AREA(S) TWICE DAILY FOR UP TO TWO WEEKS AS NEEDED FOR eczema  3   • ubidecarenone (CO Q-10 ORAL) Take 1 tablet by mouth daily.     • vitamins  A,C,E-zinc-copper 14,320-226-200 unit-mg-unit capsule 2 capsules daily       No current facility-administered medications for this visit.       Review of Systems:  Review of Systems   Constitutional: Positive for weight gain.   HENT: Positive for congestion.    Eyes: Negative.    Cardiovascular: Positive for dyspnea on exertion.   Respiratory: Positive for sputum production.         Sputum mis white to yellow   Endocrine: Negative.    Hematologic/Lymphatic: Bruises/bleeds easily.   Skin: Negative.    Musculoskeletal: Positive for joint pain  and myalgias.   Gastrointestinal: Negative.    Genitourinary: Positive for nocturia.   Neurological: Negative.    Psychiatric/Behavioral: Negative.    Allergic/Immunologic: Positive for environmental allergies.       Physical Exam:  Vitals:    06/16/22 0809   BP: 120/74   Pulse: 84   Resp: 18     Physical Exam  Constitutional:       Appearance: He is well-developed.   HENT:      Head: Normocephalic and atraumatic.   Eyes:      Conjunctiva/sclera: Conjunctivae normal.      Pupils: Pupils are equal, round, and reactive to light.   Cardiovascular:      Rate and Rhythm: Normal rate and regular rhythm.      Pulses: Intact distal pulses.      Heart sounds: Murmur heard.     Gallop present.   Pulmonary:      Effort: Pulmonary effort is normal.      Breath sounds: Normal breath sounds.   Abdominal:      General: Bowel sounds are normal.      Palpations: Abdomen is soft.   Musculoskeletal:         General: Normal range of motion.      Cervical back: Normal range of motion and neck supple.   Skin:     General: Skin is warm and dry.   Neurological:      Mental Status: He is alert and oriented to person, place, and time.      Deep Tendon Reflexes: Reflexes are normal and symmetric.   Psychiatric:         Speech: Speech normal.         Behavior: Behavior normal.         Thought Content: Thought content normal.         Judgment: Judgment normal.       EKG: SR Qs inferioir and laterally no change    Assessment and Plan:  Impression:  Hypertension controlled.  Mild LVH on echocardiogram secondary to his aortic regurgitation.  Abnormal EKG secondary to LVH.  Mitral regurgitation.  Tricuspid regurgitation.  Paroxysmal atrial fibrillation chronic renal sufficiency.  Thoracic aortic aneurysm.  Diabetes mellitus type 2 diet controlled.  Recommendation:  Trial is hemodynamically stable there are no medications to adjust at this point.  We are waiting for his repeat lipid profile and hemoglobin A1c which will be drawn this week.  As you  know there is a shortage of contrast so he is yearly CAT scan to follow his thoracic aortic aneurysm has been postponed till September.  We will see him in 6 months time we will get a repeat echocardiogram to follow his valvular heart disease we counseled him on diet and exercise.  Thank you for allowing us see this very nice gentleman in the office today.    Andrew Barrow, DO

## 2022-06-16 NOTE — PROGRESS NOTES
Chief Complaint: I saw Naif today visit for his aortic regurgitation and mitral regurgitation and thoracic aortic aneurysm.  Impression he denies any form of chest discomfort or shortness of breath with exertion, anxiety, cold weather, postprandially, with sex, at rest, or nocturnally.  He denies exertional dyspnea, proximal nocturnal dyspnea, orthopnea, palpitations, syncope, ankle edema, he has nocturia.       Medications:  Current Outpatient Medications   Medication Sig Dispense Refill   • aspirin 81 mg enteric coated tablet Take 81 mg by mouth daily.     • atorvastatin (LIPITOR) 40 mg tablet Take 1 tablet (40 mg total) by mouth daily. 91 tablet 3   • triamcinolone (KENALOG) 0.1 % ointment APPLY TO THE AFFECTED AREA(S) TWICE DAILY FOR UP TO TWO WEEKS AS NEEDED FOR eczema  3   • ubidecarenone (CO Q-10 ORAL) Take 1 tablet by mouth daily.     • vitamins  A,C,E-zinc-copper 14,320-226-200 unit-mg-unit capsule 2 capsules daily       No current facility-administered medications for this visit.       Review of Systems:  Review of Systems   Constitutional: Positive for weight gain.   HENT: Positive for congestion.    Eyes: Negative.    Cardiovascular: Positive for dyspnea on exertion.   Respiratory: Positive for sputum production.         Sputum mis white to yellow   Endocrine: Negative.    Hematologic/Lymphatic: Bruises/bleeds easily.   Skin: Negative.    Musculoskeletal: Positive for joint pain and myalgias.   Gastrointestinal: Negative.    Genitourinary: Positive for nocturia.   Neurological: Negative.    Psychiatric/Behavioral: Negative.    Allergic/Immunologic: Positive for environmental allergies.       Physical Exam:  Vitals:    06/16/22 0809   BP: 120/74   Pulse: 84   Resp: 18     Physical Exam  Constitutional:       Appearance: He is well-developed.   HENT:      Head: Normocephalic and atraumatic.   Eyes:      Conjunctiva/sclera: Conjunctivae normal.      Pupils: Pupils are equal, round, and reactive to  light.   Cardiovascular:      Rate and Rhythm: Normal rate and regular rhythm.      Pulses: Intact distal pulses.      Heart sounds: Murmur heard.     Gallop present.   Pulmonary:      Effort: Pulmonary effort is normal.      Breath sounds: Normal breath sounds.   Abdominal:      General: Bowel sounds are normal.      Palpations: Abdomen is soft.   Musculoskeletal:         General: Normal range of motion.      Cervical back: Normal range of motion and neck supple.   Skin:     General: Skin is warm and dry.   Neurological:      Mental Status: He is alert and oriented to person, place, and time.      Deep Tendon Reflexes: Reflexes are normal and symmetric.   Psychiatric:         Speech: Speech normal.         Behavior: Behavior normal.         Thought Content: Thought content normal.         Judgment: Judgment normal.       EKG: SR Qs inferioir and laterally no change    Assessment and Plan:  Impression:  Hypertension controlled.  Mild LVH on echocardiogram secondary to his aortic regurgitation.  Abnormal EKG secondary to LVH.  Mitral regurgitation.  Tricuspid regurgitation.  Paroxysmal atrial fibrillation chronic renal sufficiency.  Thoracic aortic aneurysm.  Diabetes mellitus type 2 diet controlled.  Recommendation:  Trial is hemodynamically stable there are no medications to adjust at this point.  We are waiting for his repeat lipid profile and hemoglobin A1c which will be drawn this week.  As you know there is a shortage of contrast so he is yearly CAT scan to follow his thoracic aortic aneurysm has been postponed till September.  We will see him in 6 months time we will get a repeat echocardiogram to follow his valvular heart disease we counseled him on diet and exercise.  Thank you for allowing us see this very nice gentleman in the office today.    Andrew Barrow DO

## 2022-09-02 LAB
BUN SERPL-MCNC: 11 MG/DL (ref 8–27)
CREAT SERPL-MCNC: 0.78 MG/DL (ref 0.76–1.27)
EGFRCR-CYS SERPLBLD CKD-EPI 2021: 91 ML/MIN/1.73

## 2022-09-09 ENCOUNTER — OFFICE VISIT (OUTPATIENT)
Dept: CARDIOTHORACIC SURGERY | Facility: CLINIC | Age: 82
End: 2022-09-09
Payer: MEDICARE

## 2022-09-09 ENCOUNTER — HOSPITAL ENCOUNTER (OUTPATIENT)
Dept: RADIOLOGY | Facility: HOSPITAL | Age: 82
Discharge: HOME | End: 2022-09-09
Attending: PHYSICIAN ASSISTANT
Payer: MEDICARE

## 2022-09-09 VITALS
RESPIRATION RATE: 18 BRPM | WEIGHT: 219 LBS | TEMPERATURE: 97.8 F | HEART RATE: 91 BPM | BODY MASS INDEX: 32.44 KG/M2 | DIASTOLIC BLOOD PRESSURE: 80 MMHG | HEIGHT: 69 IN | OXYGEN SATURATION: 98 % | SYSTOLIC BLOOD PRESSURE: 110 MMHG

## 2022-09-09 DIAGNOSIS — I71.20 THORACIC AORTIC ANEURYSM WITHOUT RUPTURE (CMS/HCC): ICD-10-CM

## 2022-09-09 DIAGNOSIS — I71.21 ASCENDING AORTIC ANEURYSM (CMS/HCC): Primary | ICD-10-CM

## 2022-09-09 PROCEDURE — 99214 OFFICE O/P EST MOD 30 MIN: CPT | Performed by: PHYSICIAN ASSISTANT

## 2022-09-09 PROCEDURE — 63600105 HC IODINE BASED CONTRAST: Performed by: PHYSICIAN ASSISTANT

## 2022-09-09 PROCEDURE — G8752 SYS BP LESS 140: HCPCS | Performed by: PHYSICIAN ASSISTANT

## 2022-09-09 PROCEDURE — 71275 CT ANGIOGRAPHY CHEST: CPT | Mod: MG

## 2022-09-09 PROCEDURE — G8754 DIAS BP LESS 90: HCPCS | Performed by: PHYSICIAN ASSISTANT

## 2022-09-09 RX ADMIN — IOHEXOL 100 ML: 350 INJECTION, SOLUTION INTRAVENOUS at 14:07

## 2022-09-09 NOTE — LETTER
September 14, 2022     Armin Aaron Sr., DO  [address] [facility] [address]    Patient: Naif De La Garza  YOB: 1940  Date of Visit: 9/9/2022      Dear Dr. Aaron:    Thank you for referring Naif De La Garza to me for evaluation. Below are my notes for this consultation.    If you have questions, please do not hesitate to call me. I look forward to following your patient along with you.         Sincerely,        LYSSA Ortega        CC: DO Elpidio Velasco Maysoon M, PA C  9/14/2022  9:38 PM  Sign when Signing Visit     [facility]  Dr. Jose Phan- System Chief Cardiothoracic Surgery  Tori Magallanes PA-C- Aortic   [facility]  [phone]         Reason for visit:   No chief complaint on file.     HPI    Naif De La Garza is a 81 y.o. male who presents for approximate annual follow-up of a known ascending aortic aneurysm.  He was initially seen in our aortic clinic on 7/15/2021.  At that time the ascending aorta measured 4.1 cm without evidence of dissection.  His past medical history is significant for hypertension, mild aortic insufficiency.  The patient currently denies chest pain, shortness of breath, palpitations, lightheadedness or syncope.  He is a retired .  He reports a family history of CAD in his brother and sister and his father had an abdominal aneurysm.  Patient continues to perform normal daily activities without limitation.     Past Medical History:   Diagnosis Date    Aortic aneurysm (CMS/HCC)     Coronary artery disease     History of colon cancer     Hyperlipidemia     Lipid disorder      Past Surgical History:   Procedure Laterality Date    BUNIONECTOMY  2009    COLON SURGERY       No known allergies  Current Outpatient Medications   Medication Sig Dispense Refill    aspirin 81 mg enteric coated tablet Take 81 mg by mouth daily.      atorvastatin (LIPITOR) 40 mg tablet Take 1  tablet (40 mg total) by mouth daily. 91 tablet 3    triamcinolone (KENALOG) 0.1 % ointment APPLY TO THE AFFECTED AREA(S) TWICE DAILY FOR UP TO TWO WEEKS AS NEEDED FOR eczema  3    ubidecarenone (CO Q-10 ORAL) Take 1 tablet by mouth daily.      vitamins  A,C,E-zinc-copper 14,320-226-200 unit-mg-unit capsule 2 capsules daily       No current facility-administered medications for this visit.     Social History     Socioeconomic History    Marital status:      Spouse name: None    Number of children: None    Years of education: None    Highest education level: None   Tobacco Use    Smoking status: Former Smoker     Packs/day: 1.00     Types: Cigarettes     Quit date:      Years since quittin.7    Smokeless tobacco: Never Used   Vaping Use    Vaping Use: Never used   Substance and Sexual Activity    Alcohol use: Yes     Comment: occasionally    Drug use: No    Sexual activity: Defer     Family History   Problem Relation Age of Onset    Breast cancer Biological Mother         Current Outpatient Medications:     aspirin 81 mg enteric coated tablet, Take 81 mg by mouth daily., Disp: , Rfl:     atorvastatin (LIPITOR) 40 mg tablet, Take 1 tablet (40 mg total) by mouth daily., Disp: 91 tablet, Rfl: 3    triamcinolone (KENALOG) 0.1 % ointment, APPLY TO THE AFFECTED AREA(S) TWICE DAILY FOR UP TO TWO WEEKS AS NEEDED FOR eczema, Disp: , Rfl: 3    ubidecarenone (CO Q-10 ORAL), Take 1 tablet by mouth daily., Disp: , Rfl:     vitamins  A,C,E-zinc-copper 14,320-226-200 unit-mg-unit capsule, 2 capsules daily, Disp: , Rfl:     Review of Systems   Constitutional: Negative.   HENT: Negative.    Eyes: Negative.    Respiratory: Negative.    Endocrine: Negative.    Skin: Negative.    Musculoskeletal: Negative.    Gastrointestinal: Negative.    Genitourinary: Negative.    Neurological: Negative.    Psychiatric/Behavioral: Negative.    Allergic/Immunologic: Negative.       Objective    Vitals:    22  1420   BP: 110/80   Pulse: 91   Resp: 18   Temp: 36.6 °C (97.8 °F)   SpO2: 98%      Physical Exam  Constitutional:       General: He is not in acute distress.     Appearance: He is well-developed.   HENT:      Head: Normocephalic and atraumatic.      Right Ear: External ear normal.      Left Ear: External ear normal.      Nose: Nose normal.      Mouth/Throat:      Pharynx: No oropharyngeal exudate.   Eyes:      General: No scleral icterus.        Right eye: No discharge.      Conjunctiva/sclera: Conjunctivae normal.      Pupils: Pupils are equal, round, and reactive to light.   Cardiovascular:      Rate and Rhythm: Normal rate and regular rhythm.      Pulses: Intact distal pulses.      Heart sounds: S1 normal and S2 normal. No murmur heard.  Pulmonary:      Effort: Pulmonary effort is normal. No respiratory distress.      Breath sounds: Normal breath sounds. No wheezing or rales.   Chest:      Chest wall: No tenderness.   Abdominal:      General: Bowel sounds are normal. There is no distension.      Palpations: Abdomen is soft. There is no mass.      Tenderness: There is no abdominal tenderness. There is no guarding or rebound.   Musculoskeletal:         General: No tenderness or deformity. Normal range of motion.      Cervical back: Normal range of motion and neck supple.   Skin:     General: Skin is warm and dry.      Coloration: Skin is not pale.      Findings: No erythema or rash.   Neurological:      Mental Status: He is alert and oriented to person, place, and time.      Deep Tendon Reflexes: Reflexes are normal and symmetric.   Psychiatric:         Behavior: Behavior normal.         Thought Content: Thought content normal.         Judgment: Judgment normal.                 Imaging:      CTA Chest 9/9/22:      COMPARISON: CTA July 7, 2021.     CT DOSE:  One or more dose reduction techniques (e.g. automated exposure  control, adjustment of the mA and/or kV according to patient size, use of  iterative  reconstruction technique) utilized for this examination.     --  IMPRESSION:  1.  Ascending thoracic aortic ectasia, without significant change since the  prior study of July 2021.  2.  CC: LUNG NAVIGATOR  Fleischner Society 2017 Guidelines for Management of Incidentally Detected  Pulmonary Nodules in Adults. (Solid Nodules)     Nodule size <6 mm (single)  Low-Risk Patient - No routine follow-up  High-Risk Patient - Optional CT at 12 months     Nodule size < 6 mm (multiple)  Low-Risk Patient - No routine follow-up  High-Risk Patient - Optional CT at 12 months     COMMENT:     Postoperative Changes: None.  Chest:     Heart and pericardium: Normal in size without effusion. Right left coronary  artery calcifications.  Vessels: Patent and normal.  Aortic annulus: 25 mm.  Sinus of Valsalva: 41 mm.  Sinotubular junction: 34 mm.  Ascending aorta at level of main pulmonary artery: 41 mm.  Proximal descending, at the main pulmonary artery: 28 mm.  Diaphragmatic hiatus: 27 mm.     Amy and mediastinum: Normal without lymphadenopathy or mass.     Tracheobronchial tree: Patent.  Lungs:  Right: 4 mm right middle lobe nodule (series A3, image 62).  Left: No nodules or air space disease.  Both: Otherwise clear bilaterally.  Pleura: Normal without fluid or thickening.     Lower neck: Normal.  Chest wall and axilla : Normal.  Bones: Thoracic spondylosis without acute skeletal abnormality.     The fully visualized liver, gallbladder, stomach, spleen, and bilateral adrenal  glands are normal. The portions of the incompletely visualized pancreas,  kidneys, and small and large intestine are normal.    TTE 6/30/21:    Interpretation Summary       · Normal-sized LV. Mild concentric left ventricular hypertrophy.  · Normal LV systolic function. Estimated EF 65%. Normal LV septal wall motion. No regional wall motion abnormalities. Grade I LV diastolic dysfunction. LV diastolic inflow pattern consistent with impaired  relaxation.  · Normal-sized RV. Normal RV systolic function.  · Moderately dilated LA. No patent foramen ovale present as seen in the LA. LA septal aneurysm present.  · Normal-sized RA.  · Aortic root calcificied. Sinuses of Valsalva calcified. Ascending aorta sclerotic. Aortic arch grossly normal. Descending aorta difficult to visualize. Dilatation of the aorta at the sinuses of Valsalva. Aortic aneurysm present in the aortic root (sinus level).  · Tricuspid aortic valve. Sclerotic aortic valve leaflets. Mild aortic valve regurgitation with an eccentrically directed jet.  · Grossly normal leaflet structure of the mitral valve. Mild mitral annular calcification.  · Mild mitral valve regurgitation. The jet is centrally directed.  · Tricuspid valve structure is grossly normal. Mild tricuspid valve regurgitation. The regurgitation jet is central.  · Estimated RVSP = 26 mmHg.  · Pulmonic valve not well visualized.  · IVC is a small caliber vessel (<1.7cm). IVC collapses >50% during inspiration.  · Normal pericardial structure. No evidence of pericardial effusion. No cardiac tamponade.          Assessment/Plan    No problem-specific Assessment & Plan notes found for this encounter.      Mr. De La Garza is a 81 year old male who presents for annual follow-up of a stable ascending aortic aneurysm that remains unchanged in size measuring 4.1 cm without evidence of dissection.  The aortic root also remains unchanged measuring 4.1 cm.  At this time the patient denies chest pain, palpitations, lightheadedness or shortness of breath.  I have advised him to continue following regularly with Dr. Barrow for his cardiovascular care.  I would like to see him in 18 months with a repeat gated CTA of the chest.  In the meantime he was advised to contact us with any questions or concerns.    The following recommendations were made to the patient: Do not lift in excess of 40 pounds.  Heavy lifting or straining causes brief spikes in blood  pressure, which we would like to avoid.  The patient was also counseled on exercise parameters and was advised low impact cardiovascular exercise is ok and may do light weightlifting.  We have recommended that the patient monitor their heart rate during exercise.  The patient was also counseled on the importance of maintaining good blood pressure control and was advised to follow regularly with their cardiologist/PCP. Given the FDA safety statement regarding the use of Fluoroquinolones (increased risk of aneurysmal growth or aortic dissection) in patients with aortic disease, they were advised to avoid this class of antibiotics when possible.  In the unlikely event of sudden onset of chest pain or pain that radiates to the upper back, they were advised to report to the closest ED and have our service notified.          Thank you for allowing me to participate in the care of this patient.  I hope this information is helpful.     LYSSA Ortega 9/14/2022  9:30 PM

## 2022-09-14 ASSESSMENT — ENCOUNTER SYMPTOMS
ALLERGIC/IMMUNOLOGIC NEGATIVE: 1
CONSTITUTIONAL NEGATIVE: 1
PSYCHIATRIC NEGATIVE: 1
EYES NEGATIVE: 1
GASTROINTESTINAL NEGATIVE: 1
RESPIRATORY NEGATIVE: 1
ENDOCRINE NEGATIVE: 1
NEUROLOGICAL NEGATIVE: 1
MUSCULOSKELETAL NEGATIVE: 1

## 2022-09-15 NOTE — PROGRESS NOTES
[facility]  Dr. Jose Phan- System Chief Cardiothoracic Surgery  Tori Magallanes PA-C- Aortic   [facility]  [phone]         Reason for visit:   No chief complaint on file.     BHARAT De La Garza is a 81 y.o. male who presents for approximate annual follow-up of a known ascending aortic aneurysm.  He was initially seen in our aortic clinic on 7/15/2021.  At that time the ascending aorta measured 4.1 cm without evidence of dissection.  His past medical history is significant for hypertension, mild aortic insufficiency.  The patient currently denies chest pain, shortness of breath, palpitations, lightheadedness or syncope.  He is a retired .  He reports a family history of CAD in his brother and sister and his father had an abdominal aneurysm.  Patient continues to perform normal daily activities without limitation.     Past Medical History:   Diagnosis Date    Aortic aneurysm (CMS/HCC)     Coronary artery disease     History of colon cancer     Hyperlipidemia     Lipid disorder      Past Surgical History:   Procedure Laterality Date    BUNIONECTOMY  2009    COLON SURGERY       No known allergies  Current Outpatient Medications   Medication Sig Dispense Refill    aspirin 81 mg enteric coated tablet Take 81 mg by mouth daily.      atorvastatin (LIPITOR) 40 mg tablet Take 1 tablet (40 mg total) by mouth daily. 91 tablet 3    triamcinolone (KENALOG) 0.1 % ointment APPLY TO THE AFFECTED AREA(S) TWICE DAILY FOR UP TO TWO WEEKS AS NEEDED FOR eczema  3    ubidecarenone (CO Q-10 ORAL) Take 1 tablet by mouth daily.      vitamins  A,C,E-zinc-copper 14,320-226-200 unit-mg-unit capsule 2 capsules daily       No current facility-administered medications for this visit.     Social History     Socioeconomic History    Marital status:      Spouse name: None    Number of children: None    Years of education: None    Highest education  level: None   Tobacco Use    Smoking status: Former Smoker     Packs/day: 1.00     Types: Cigarettes     Quit date:      Years since quittin.7    Smokeless tobacco: Never Used   Vaping Use    Vaping Use: Never used   Substance and Sexual Activity    Alcohol use: Yes     Comment: occasionally    Drug use: No    Sexual activity: Defer     Family History   Problem Relation Age of Onset    Breast cancer Biological Mother         Current Outpatient Medications:     aspirin 81 mg enteric coated tablet, Take 81 mg by mouth daily., Disp: , Rfl:     atorvastatin (LIPITOR) 40 mg tablet, Take 1 tablet (40 mg total) by mouth daily., Disp: 91 tablet, Rfl: 3    triamcinolone (KENALOG) 0.1 % ointment, APPLY TO THE AFFECTED AREA(S) TWICE DAILY FOR UP TO TWO WEEKS AS NEEDED FOR eczema, Disp: , Rfl: 3    ubidecarenone (CO Q-10 ORAL), Take 1 tablet by mouth daily., Disp: , Rfl:     vitamins  A,C,E-zinc-copper 14,320-226-200 unit-mg-unit capsule, 2 capsules daily, Disp: , Rfl:     Review of Systems   Constitutional: Negative.   HENT: Negative.    Eyes: Negative.    Respiratory: Negative.    Endocrine: Negative.    Skin: Negative.    Musculoskeletal: Negative.    Gastrointestinal: Negative.    Genitourinary: Negative.    Neurological: Negative.    Psychiatric/Behavioral: Negative.    Allergic/Immunologic: Negative.       Objective    Vitals:    22 1420   BP: 110/80   Pulse: 91   Resp: 18   Temp: 36.6 °C (97.8 °F)   SpO2: 98%      Physical Exam  Constitutional:       General: He is not in acute distress.     Appearance: He is well-developed.   HENT:      Head: Normocephalic and atraumatic.      Right Ear: External ear normal.      Left Ear: External ear normal.      Nose: Nose normal.      Mouth/Throat:      Pharynx: No oropharyngeal exudate.   Eyes:      General: No scleral icterus.        Right eye: No discharge.      Conjunctiva/sclera: Conjunctivae normal.      Pupils: Pupils are equal, round, and reactive  to light.   Cardiovascular:      Rate and Rhythm: Normal rate and regular rhythm.      Pulses: Intact distal pulses.      Heart sounds: S1 normal and S2 normal. No murmur heard.  Pulmonary:      Effort: Pulmonary effort is normal. No respiratory distress.      Breath sounds: Normal breath sounds. No wheezing or rales.   Chest:      Chest wall: No tenderness.   Abdominal:      General: Bowel sounds are normal. There is no distension.      Palpations: Abdomen is soft. There is no mass.      Tenderness: There is no abdominal tenderness. There is no guarding or rebound.   Musculoskeletal:         General: No tenderness or deformity. Normal range of motion.      Cervical back: Normal range of motion and neck supple.   Skin:     General: Skin is warm and dry.      Coloration: Skin is not pale.      Findings: No erythema or rash.   Neurological:      Mental Status: He is alert and oriented to person, place, and time.      Deep Tendon Reflexes: Reflexes are normal and symmetric.   Psychiatric:         Behavior: Behavior normal.         Thought Content: Thought content normal.         Judgment: Judgment normal.                 Imaging:      CTA Chest 9/9/22:      COMPARISON: CTA July 7, 2021.     CT DOSE:  One or more dose reduction techniques (e.g. automated exposure  control, adjustment of the mA and/or kV according to patient size, use of  iterative reconstruction technique) utilized for this examination.     --  IMPRESSION:  1.  Ascending thoracic aortic ectasia, without significant change since the  prior study of July 2021.  2.  CC: LUNG NAVIGATOR  Fleischner Society 2017 Guidelines for Management of Incidentally Detected  Pulmonary Nodules in Adults. (Solid Nodules)     Nodule size <6 mm (single)  Low-Risk Patient - No routine follow-up  High-Risk Patient - Optional CT at 12 months     Nodule size < 6 mm (multiple)  Low-Risk Patient - No routine follow-up  High-Risk Patient - Optional CT at 12  months     COMMENT:     Postoperative Changes: None.  Chest:     Heart and pericardium: Normal in size without effusion. Right left coronary  artery calcifications.  Vessels: Patent and normal.  Aortic annulus: 25 mm.  Sinus of Valsalva: 41 mm.  Sinotubular junction: 34 mm.  Ascending aorta at level of main pulmonary artery: 41 mm.  Proximal descending, at the main pulmonary artery: 28 mm.  Diaphragmatic hiatus: 27 mm.     Amy and mediastinum: Normal without lymphadenopathy or mass.     Tracheobronchial tree: Patent.  Lungs:  Right: 4 mm right middle lobe nodule (series A3, image 62).  Left: No nodules or air space disease.  Both: Otherwise clear bilaterally.  Pleura: Normal without fluid or thickening.     Lower neck: Normal.  Chest wall and axilla : Normal.  Bones: Thoracic spondylosis without acute skeletal abnormality.     The fully visualized liver, gallbladder, stomach, spleen, and bilateral adrenal  glands are normal. The portions of the incompletely visualized pancreas,  kidneys, and small and large intestine are normal.    TTE 6/30/21:    Interpretation Summary       · Normal-sized LV. Mild concentric left ventricular hypertrophy.  · Normal LV systolic function. Estimated EF 65%. Normal LV septal wall motion. No regional wall motion abnormalities. Grade I LV diastolic dysfunction. LV diastolic inflow pattern consistent with impaired relaxation.  · Normal-sized RV. Normal RV systolic function.  · Moderately dilated LA. No patent foramen ovale present as seen in the LA. LA septal aneurysm present.  · Normal-sized RA.  · Aortic root calcificied. Sinuses of Valsalva calcified. Ascending aorta sclerotic. Aortic arch grossly normal. Descending aorta difficult to visualize. Dilatation of the aorta at the sinuses of Valsalva. Aortic aneurysm present in the aortic root (sinus level).  · Tricuspid aortic valve. Sclerotic aortic valve leaflets. Mild aortic valve regurgitation with an eccentrically directed  jet.  · Grossly normal leaflet structure of the mitral valve. Mild mitral annular calcification.  · Mild mitral valve regurgitation. The jet is centrally directed.  · Tricuspid valve structure is grossly normal. Mild tricuspid valve regurgitation. The regurgitation jet is central.  · Estimated RVSP = 26 mmHg.  · Pulmonic valve not well visualized.  · IVC is a small caliber vessel (<1.7cm). IVC collapses >50% during inspiration.  · Normal pericardial structure. No evidence of pericardial effusion. No cardiac tamponade.          Assessment/Plan    No problem-specific Assessment & Plan notes found for this encounter.      Mr. De La Garza is a 81 year old male who presents for annual follow-up of a stable ascending aortic aneurysm that remains unchanged in size measuring 4.1 cm without evidence of dissection.  The aortic root also remains unchanged measuring 4.1 cm.  At this time the patient denies chest pain, palpitations, lightheadedness or shortness of breath.  I have advised him to continue following regularly with Dr. Barrow for his cardiovascular care.  I would like to see him in 18 months with a repeat gated CTA of the chest.  In the meantime he was advised to contact us with any questions or concerns.    The following recommendations were made to the patient: Do not lift in excess of 40 pounds.  Heavy lifting or straining causes brief spikes in blood pressure, which we would like to avoid.  The patient was also counseled on exercise parameters and was advised low impact cardiovascular exercise is ok and may do light weightlifting.  We have recommended that the patient monitor their heart rate during exercise.  The patient was also counseled on the importance of maintaining good blood pressure control and was advised to follow regularly with their cardiologist/PCP. Given the FDA safety statement regarding the use of Fluoroquinolones (increased risk of aneurysmal growth or aortic dissection) in patients with aortic  disease, they were advised to avoid this class of antibiotics when possible.  In the unlikely event of sudden onset of chest pain or pain that radiates to the upper back, they were advised to report to the closest ED and have our service notified.          Thank you for allowing me to participate in the care of this patient.  I hope this information is helpful.     LYSSA Ortega 9/14/2022  9:30 PM

## 2022-10-26 NOTE — HPI: SKIN LESION
Addended by: STEPHON IBRAHIM on: 10/26/2022 03:44 PM     Modules accepted: Orders    
How Severe Is Your Skin Lesion?: moderate
Has Your Skin Lesion Been Treated?: been treated
Is This A New Presentation, Or A Follow-Up?: Skin Lesion

## 2023-01-19 ENCOUNTER — HOSPITAL ENCOUNTER (OUTPATIENT)
Dept: CARDIOLOGY | Facility: CLINIC | Age: 83
Discharge: HOME | End: 2023-01-19
Attending: INTERNAL MEDICINE
Payer: MEDICARE

## 2023-01-19 ENCOUNTER — OFFICE VISIT (OUTPATIENT)
Dept: CARDIOLOGY | Facility: CLINIC | Age: 83
End: 2023-01-19
Payer: MEDICARE

## 2023-01-19 VITALS
SYSTOLIC BLOOD PRESSURE: 110 MMHG | WEIGHT: 220 LBS | DIASTOLIC BLOOD PRESSURE: 68 MMHG | BODY MASS INDEX: 32.58 KG/M2 | HEIGHT: 69 IN

## 2023-01-19 VITALS
WEIGHT: 220.6 LBS | RESPIRATION RATE: 16 BRPM | SYSTOLIC BLOOD PRESSURE: 110 MMHG | HEIGHT: 69 IN | DIASTOLIC BLOOD PRESSURE: 68 MMHG | BODY MASS INDEX: 32.67 KG/M2 | HEART RATE: 75 BPM

## 2023-01-19 DIAGNOSIS — I35.1 NONRHEUMATIC AORTIC VALVE INSUFFICIENCY: Primary | ICD-10-CM

## 2023-01-19 LAB
AORTIC ROOT ANNULUS - M-MODE: 4 CM
AORTIC ROOT ANNULUS: 4 CM
ASCENDING AORTA: 4.1 CM
AV PEAK GRADIENT: 7 MMHG
AV PEAK VELOCITY-S: 1.28 M/S
AV VALVE AREA: 3.37 CM2
BSA FOR ECHO PROCEDURE: 2.2 M2
CUSP SEPARATION: 2.3 CM
DOP CALC LVOT STROKE VOLUME: 130.51 CM3
E WAVE DECELERATION TIME: 282 MS
E/A RATIO: 0.8
E/E' RATIO: 8.9
E/LAT E' RATIO: 8.9
FRACTIONAL SHORTENING: 56.35 %
INTERVENTRICULAR SEPTUM: 0.98 CM
LA ESV (BP): 61 CM3
LA ESV INDEX (A2C): 29.14 CM3/M2
LA ESV INDEX (BP): 27.73 CM3/M2
LA/AORTA RATIO: 1.08
LAAS-AP2: 21.9 CM2
LAAS-AP4: 21.3 CM2
LAD 2D - M-MODE: 4.3 CM
LAD 2D: 4.3 CM
LALD A4C: 6.07 CM
LALD A4C: 7.01 CM
LAV-S: 64.1 CM3
LEFT ATRIUM VOLUME INDEX: 23.09 CM3/M2
LEFT ATRIUM VOLUME: 50.8 CM3
LEFT INTERNAL DIMENSION IN SYSTOLE: 1.89 CM (ref 3.46–5.23)
LEFT VENTRICULAR INTERNAL DIMENSION IN DIASTOLE: 4.33 CM (ref 5.92–8.22)
LEFT VENTRICULAR POSTERIOR WALL IN END DIASTOLE: 0.98 CM (ref 0.74–1.38)
LVOT 2D: 2.5 CM
LVOT A: 4.91 CM2
LVOT MG: 3 MMHG
LVOT MV: 0.76 M/S
LVOT PEAK VELOCITY: 1.12 M/S
LVOT PG: 5 MMHG
LVOT STROKE VOLUME INDEX: 59.32 ML/M2
LVOT VTI: 26.6 CM
MLH CV ECHO AVA INDEX VELOCITY RATIO: 1.5
MV E'TISSUE VEL-LAT: 0.09 M/S
MV E'TISSUE VEL-MED: 0.09 M/S
MV PEAK A VEL: 1.05 M/S
MV PEAK E VEL: 0.8 M/S
POSTERIOR WALL: 0.98 CM
PV PEAK GRADIENT: 8 MMHG
PV PV: 1.41 M/S
RVOT VMAX: 0.62 M/S
RVOT VTI: 13.2 CM
SEPTAL TISSUE DOPPLER FREE WALL LATE DIA VELOCITY (APICAL 4 CHAMBER VIEW): 0.15 M/S
Z-SCORE OF LEFT VENTRICULAR DIMENSION IN END DIASTOLE: -4.77
Z-SCORE OF LEFT VENTRICULAR DIMENSION IN END SYSTOLE: -6.41
Z-SCORE OF LEFT VENTRICULAR POSTERIOR WALL IN END DIASTOLE: -0.15

## 2023-01-19 PROCEDURE — G8752 SYS BP LESS 140: HCPCS | Performed by: INTERNAL MEDICINE

## 2023-01-19 PROCEDURE — 99214 OFFICE O/P EST MOD 30 MIN: CPT | Performed by: INTERNAL MEDICINE

## 2023-01-19 PROCEDURE — 93306 TTE W/DOPPLER COMPLETE: CPT | Performed by: INTERNAL MEDICINE

## 2023-01-19 PROCEDURE — G8754 DIAS BP LESS 90: HCPCS | Performed by: INTERNAL MEDICINE

## 2023-01-19 PROCEDURE — 93000 ELECTROCARDIOGRAM COMPLETE: CPT | Performed by: INTERNAL MEDICINE

## 2023-01-19 RX ORDER — ATORVASTATIN CALCIUM 40 MG/1
40 TABLET, FILM COATED ORAL DAILY
Qty: 90 TABLET | Refills: 3 | Status: SHIPPED | OUTPATIENT
Start: 2023-01-19 | End: 2024-04-16

## 2023-01-19 ASSESSMENT — ENCOUNTER SYMPTOMS
NEUROLOGICAL NEGATIVE: 1
PSYCHIATRIC NEGATIVE: 1
EYES NEGATIVE: 1
HEMATOLOGIC/LYMPHATIC NEGATIVE: 1
CONSTIPATION: 1
ALLERGIC/IMMUNOLOGIC NEGATIVE: 1
WEIGHT GAIN: 1
RESPIRATORY NEGATIVE: 1
CARDIOVASCULAR NEGATIVE: 1
ENDOCRINE NEGATIVE: 1

## 2023-01-19 NOTE — LETTER
January 19, 2023     Armin Aaron Sr., DO  [address] [facility] [address]    Patient: Naif De La Garza  YOB: 1940  Date of Visit: 1/19/2023      Dear Dr. Aaron:    Thank you for referring Naif De La Garza to me for evaluation. Below are my notes for this consultation.    If you have questions, please do not hesitate to call me. I look forward to following your patient along with you.         Sincerely,        Andrew Barrow DO        CC: No Recipients  Andrew Barrow DO  1/19/2023  8:44 AM  Signed      Chief Complaint: I saw Baljeet in the office today on a routine visit for his borderline hypertension and hyperlipidemia.  Unfortunately try stopped exercising and has gained some weight but apparently he states that his cholesterol numbers are good and he will bring them to me this afternoon.  He denies chest discomfort or shortness of breath with exertion, anxiety, cold weather, postprandially, with sex, at rest, or nocturnally.  He denies exertional dyspnea, proximal nocturnal dyspnea, orthopnea, palpitations, syncope, ankle edema, he has nocturia.       Medications:  Current Outpatient Medications   Medication Sig Dispense Refill   • aspirin 81 mg enteric coated tablet Take 81 mg by mouth daily.     • atorvastatin (LIPITOR) 40 mg tablet Take 1 tablet (40 mg total) by mouth daily. 90 tablet 3   • triamcinolone (KENALOG) 0.1 % ointment APPLY TO THE AFFECTED AREA(S) TWICE DAILY FOR UP TO TWO WEEKS AS NEEDED FOR eczema  3   • ubidecarenone (CO Q-10 ORAL) Take 1 tablet by mouth daily.     • vitamins  A,C,E-zinc-copper 14,320-226-200 unit-mg-unit capsule 2 capsules daily       No current facility-administered medications for this visit.       Review of Systems:  Review of Systems   Constitutional: Positive for weight gain.   HENT: Negative.    Eyes: Negative.    Cardiovascular: Negative.    Respiratory: Negative.    Endocrine: Negative.    Hematologic/Lymphatic: Negative.    Skin: Negative.     Musculoskeletal: Positive for joint pain.   Gastrointestinal: Positive for constipation.   Genitourinary: Positive for nocturia.   Neurological: Negative.    Psychiatric/Behavioral: Negative.    Allergic/Immunologic: Negative.        Physical Exam:  Vitals:    01/19/23 0817   BP: 110/68   Pulse: 75   Resp: 16     Physical Exam  Constitutional:       Appearance: He is well-developed and well-nourished.   HENT:      Head: Normocephalic and atraumatic.   Eyes:      Extraocular Movements: EOM normal.      Conjunctiva/sclera: Conjunctivae normal.      Pupils: Pupils are equal, round, and reactive to light.   Cardiovascular:      Rate and Rhythm: Normal rate and regular rhythm.      Pulses: Intact distal pulses.      Heart sounds: Normal heart sounds.   Pulmonary:      Effort: Pulmonary effort is normal.      Breath sounds: Normal breath sounds.   Abdominal:      General: Bowel sounds are normal.      Palpations: Abdomen is soft.      Comments: Ventral hernia   Musculoskeletal:         General: Normal range of motion.      Cervical back: Normal range of motion and neck supple.   Skin:     General: Skin is warm, dry and intact.   Neurological:      Mental Status: He is alert and oriented to person, place, and time.      Motor: Motor strength is normal.      Deep Tendon Reflexes: Reflexes are normal and symmetric.   Psychiatric:         Mood and Affect: Mood and affect normal.         Speech: Speech normal.         Behavior: Behavior normal.         Thought Content: Thought content normal.         Cognition and Memory: Cognition and memory normal.         Judgment: Judgment normal.       EKG:SR  To acclerated zoltan rhythm vokltage criteria for LVH  ST-Tabn    Assessment and Plan:  Impression:  Blood pressure well controlled.  Cardiomegaly.  Mitral regurgitation.  Tricuspid regurgitation.  Hypercholesterolemia.  Prediabetes.  Overweight.  Thoracic aortic aneurysm.  Atrial fibrillation paroxysmal.  Recommendation:  He  appears to be hemodynamically stable he is being followed by aortic wellness clinic.  We talked to him about resuming diet and exercise.  He is going to bring his his lipid numbers.  We are determining who is going to follow him on my detention.  Thank you for allowing us to see this very nice gentleman in the office today.    Andrew Barrow, DO

## 2023-01-19 NOTE — PROGRESS NOTES
Chief Complaint: I saw Baljeet in the office today on a routine visit for his borderline hypertension and hyperlipidemia.  Unfortunately try stopped exercising and has gained some weight but apparently he states that his cholesterol numbers are good and he will bring them to me this afternoon.  He denies chest discomfort or shortness of breath with exertion, anxiety, cold weather, postprandially, with sex, at rest, or nocturnally.  He denies exertional dyspnea, proximal nocturnal dyspnea, orthopnea, palpitations, syncope, ankle edema, he has nocturia.       Medications:  Current Outpatient Medications   Medication Sig Dispense Refill   • aspirin 81 mg enteric coated tablet Take 81 mg by mouth daily.     • atorvastatin (LIPITOR) 40 mg tablet Take 1 tablet (40 mg total) by mouth daily. 90 tablet 3   • triamcinolone (KENALOG) 0.1 % ointment APPLY TO THE AFFECTED AREA(S) TWICE DAILY FOR UP TO TWO WEEKS AS NEEDED FOR eczema  3   • ubidecarenone (CO Q-10 ORAL) Take 1 tablet by mouth daily.     • vitamins  A,C,E-zinc-copper 14,320-226-200 unit-mg-unit capsule 2 capsules daily       No current facility-administered medications for this visit.       Review of Systems:  Review of Systems   Constitutional: Positive for weight gain.   HENT: Negative.    Eyes: Negative.    Cardiovascular: Negative.    Respiratory: Negative.    Endocrine: Negative.    Hematologic/Lymphatic: Negative.    Skin: Negative.    Musculoskeletal: Positive for joint pain.   Gastrointestinal: Positive for constipation.   Genitourinary: Positive for nocturia.   Neurological: Negative.    Psychiatric/Behavioral: Negative.    Allergic/Immunologic: Negative.        Physical Exam:  Vitals:    01/19/23 0817   BP: 110/68   Pulse: 75   Resp: 16     Physical Exam  Constitutional:       Appearance: He is well-developed and well-nourished.   HENT:      Head: Normocephalic and atraumatic.   Eyes:      Extraocular Movements: EOM normal.      Conjunctiva/sclera:  Conjunctivae normal.      Pupils: Pupils are equal, round, and reactive to light.   Cardiovascular:      Rate and Rhythm: Normal rate and regular rhythm.      Pulses: Intact distal pulses.      Heart sounds: Normal heart sounds.   Pulmonary:      Effort: Pulmonary effort is normal.      Breath sounds: Normal breath sounds.   Abdominal:      General: Bowel sounds are normal.      Palpations: Abdomen is soft.      Comments: Ventral hernia   Musculoskeletal:         General: Normal range of motion.      Cervical back: Normal range of motion and neck supple.   Skin:     General: Skin is warm, dry and intact.   Neurological:      Mental Status: He is alert and oriented to person, place, and time.      Motor: Motor strength is normal.      Deep Tendon Reflexes: Reflexes are normal and symmetric.   Psychiatric:         Mood and Affect: Mood and affect normal.         Speech: Speech normal.         Behavior: Behavior normal.         Thought Content: Thought content normal.         Cognition and Memory: Cognition and memory normal.         Judgment: Judgment normal.       EKG:SR  To acclerated zoltan rhythm vokltage criteria for LVH  ST-Tabn    Assessment and Plan:  Impression:  Blood pressure well controlled.  Cardiomegaly.  Mitral regurgitation.  Tricuspid regurgitation.  Hypercholesterolemia.  Prediabetes.  Overweight.  Thoracic aortic aneurysm.  Atrial fibrillation paroxysmal.  Recommendation:  He appears to be hemodynamically stable he is being followed by aortic wellness clinic.  We talked to him about resuming diet and exercise.  He is going to bring his his lipid numbers.  We are determining who is going to follow him on my [facility].  Thank you for allowing us to see this very nice gentleman in the office today.    Andrew Barrow DO

## 2023-07-19 ENCOUNTER — OFFICE VISIT (OUTPATIENT)
Dept: CARDIOLOGY | Facility: CLINIC | Age: 83
End: 2023-07-19
Payer: MEDICARE

## 2023-07-19 VITALS
WEIGHT: 221 LBS | HEART RATE: 89 BPM | RESPIRATION RATE: 16 BRPM | HEIGHT: 69 IN | BODY MASS INDEX: 32.73 KG/M2 | SYSTOLIC BLOOD PRESSURE: 118 MMHG | DIASTOLIC BLOOD PRESSURE: 70 MMHG

## 2023-07-19 DIAGNOSIS — I35.1 NONRHEUMATIC AORTIC VALVE INSUFFICIENCY: Primary | ICD-10-CM

## 2023-07-19 PROCEDURE — 99214 OFFICE O/P EST MOD 30 MIN: CPT | Performed by: INTERNAL MEDICINE

## 2023-07-19 PROCEDURE — 93000 ELECTROCARDIOGRAM COMPLETE: CPT | Performed by: INTERNAL MEDICINE

## 2023-07-19 NOTE — ASSESSMENT & PLAN NOTE
Atorvastatin 40 mg daily.  Blood work 1-2023 shows triglycerides 61 HDL 59 LDL 55.  Normal liver function tests

## 2023-07-19 NOTE — ASSESSMENT & PLAN NOTE
Mild coronary artery calcification and aortic atherosclerosis noted on CT scan 2020.  Continue with aspirin and statin.  Unremarkable stress EKG 2021.  Low threshold for additional testing if patient develops symptoms.

## 2023-07-19 NOTE — ASSESSMENT & PLAN NOTE
History of thoracic aortic aneurysm 4.1 cm on CT scan 2021.  Patient saw CT surgery in 2022 and was asked to follow-up in approximately 18 months and will continue with surveillance clinic there with imaging as directed by surgical surveillance clinic.

## 2023-07-19 NOTE — PROGRESS NOTES
Cardiology Outpatient Note    [facility] HEART [facility] Office  [address] [place] [address]     [facility]  The Heart [address]  [address] [place] [address]     TEL  [phone]  [facility]     Naif De La Garza is a 82 y.o. male patient with history of hyyperlipidemia.  Patient previously saw Dr. Andrew Barrow.  Patient without history of  congestive heart failure, stroke, sleep apnea, myocardial infarction,  cardiac stenting, cardiac surgery, rheumatic heart disease, congenital heart disease, autoimmune disease, tuberculosis, cancer or cancer treatment, peripheral vascular disease, pulmonary embolus, or diabetes.  Prior notes state that patient has a history of paroxysmal atrial fibrillation but patient has no knowledge of this and denies this history.  He denies a history of TIA or stroke or ever being on anticoagulation.  Although listed in the chart, patient also denies a history of hypertension.    No history of major surgeries or blood transfusions.  Previous smoker and stopped in 1985.  No clear family history of accelerated coronary artery disease.    Patient had treadmill exercise stress test 2021 and exercised on Andrew protocol for 4 minutes 7 METS-unremarkable study.    Patient is currently asymptomatic.  No recent hospitalizations illnesses or COVID infections.                                                           PMH     Medical History:  Past Medical History:   Diagnosis Date   • Aortic aneurysm (CMS/HCC)    • Coronary artery disease    • History of colon cancer    • Hyperlipidemia    • Lipid disorder        Surgical History:  Past Surgical History:   Procedure Laterality Date   • BUNIONECTOMY  2009   • CATARACT EXTRACTION Bilateral 07/2023   • COLON SURGERY         Social History:  Social History     Tobacco Use   • Smoking status: Former     Packs/day: 1.00     Years: 15.00     Pack years: 15.00     Types:  "Cigarettes     Quit date:      Years since quittin.5   • Smokeless tobacco: Never   Vaping Use   • Vaping Use: Never used   Substance Use Topics   • Alcohol use: Yes     Comment: occasionally   • Drug use: No       Family History: He indicated that his biological mother is . He indicated that his biological father is .      Allergies:No known allergies    Current Medications:    Outpatient Encounter Medications as of 2023:   •  aspirin 81 mg enteric coated tablet, Take 81 mg by mouth daily.  •  atorvastatin (LIPITOR) 40 mg tablet, Take 1 tablet (40 mg total) by mouth daily.  •  triamcinolone (KENALOG) 0.1 % ointment, APPLY TO THE AFFECTED AREA(S) TWICE DAILY FOR UP TO TWO WEEKS AS NEEDED FOR eczema  •  ubidecarenone (CO Q-10 ORAL), Take 1 tablet by mouth daily.  •  vitamins  A,C,E-zinc-copper 14,320-226-200 unit-mg-unit capsule, 2 capsules daily                                                          OBJECTIVE   ROS as in HPI   Constitution: Negative for chills and fever.   Eyes: Negative for blurred vision and visual disturbance.   Cardiovascular: Negative for chest pain, dyspnea on exertion, near-syncope, palpitations and syncope.   Respiratory: Negative for hemoptysis, shortness of breath and wheezing.    Hematologic/Lymphatic: Negative for bleeding problem.   Skin: Negative for rash. No new skin changes  Gastrointestinal: Negative for abdominal pain, diarrhea, hematochezia, melena, nausea and vomiting.   Genitourinary: Negative for dysuria and hematuria.   Neurological: Negative for headaches.            Vitals:    23 1340 23 1347   BP: 120/70 118/70   BP Location: Right upper arm Right upper arm   Patient Position: Lying Sitting   Pulse: 89    Resp: 16    Weight: 100 kg (221 lb)    Height: 1.753 m (5' 9.02\")        BP Readings from Last 3 Encounters:   23 118/70   23 110/68   23 110/68     Wt Readings from Last 3 Encounters:   23 100 kg (221 lb) "   01/19/23 99.8 kg (220 lb)   01/19/23 100 kg (220 lb 9.6 oz)       Physical Exam   Constitutional: Appears comfortable in no distress  HEENT:  Neck Supple.  No JVD No carotid bruits no cervical lymphadenopathy  Head: Normocephalic.   Eyes: Extraocular movements intact  Cardiovascular: Normal rate, regular rhythm no S3 gallop Exam reveals no friction rub.    Pulmonary/Chest: Effort normal and breath sounds normal. No wheezes.  Abdominal: Soft and nontender. Bowel sounds are normal.   Musculoskeletal: No lower extremity edema.   Neurological: Alert and oriented to person, place, and time.   Skin: Skin is warm and dry.   Psychiatric: Behavior is normal.            Objective   No results found for: CHOL  No results found for: HDL  No results found for: LDLCALC  No results found for: TRIG  No results found for: ALT, AST  No results found for: WBC, HGB, HCT, PLT, INR  Lab Results   Component Value Date    BUN 11 09/01/2022    CREATININE 0.78 09/01/2022     No results found for: HGBA1C  No results found for: TSH  No results found for: BNP  [unfilled]    Troponin I Results    No lab values to display.       No results found for: HSTROPONINI                                                       IMAGING     CT angiogram 7-  Heart and pericardium: Normal in size without effusion. Right left coronary  artery calcifications.  Vessels: Patent and normal.  Aortic annulus: 25 mm.  Sinus of Valsalva: 41 mm.  Sinotubular junction: 34 mm.  Ascending aorta at level of main pulmonary artery: 41 mm.  Proximal descending, at the main pulmonary artery: 28 mm.  Diaphragmatic hiatus: 27 mm.     Amy and mediastinum: Normal without lymphadenopathy or mass.     Tracheobronchial tree: Patent.  Lungs:  Right: 4 mm right middle lobe nodule (series A3, image 62).  Left: No nodules or air space disease.  Both: Otherwise clear bilaterally.  Pleura: Normal without fluid or thickening.     Lower neck: Normal.  Chest wall and axilla :  Normal.  Bones: Thoracic spondylosis without acute skeletal abnormality.     The fully visualized liver, gallbladder, stomach, spleen, and bilateral adrenal  glands are normal. The portions of the incompletely visualized pancreas,  kidneys, and small and large intestine are normal.    TRANSTHORACIC ECHO (TTE) COMPLETE 01/19/2023    Interpretation Summary  •  Left Ventricle: Normal ventricle size. Normal wall thickness. Muscle mass is normal. Estimated EF 60%. No regional wall motion abnormalities. Grade I diastolic dysfunction.  •  Right Ventricle: Normal ventricle size. Normal wall thickness.  •  Left Atrium: Mildly dilated atrium.  •  Right Atrium: Mildly dilated atrium.  •  Aortic Valve: Tricuspid valve.  Sclerotic leaflets.  •  Mitral Valve: Mitral annular calcification. Mild regurgitation.  •  Tricuspid Valve: Structure is grossly normal. Trace regurgitation.  •  Pulmonic Valve: Valve not well visualized.  •  Aorta: Aortic root calcified. Sinuses of Valsalva calcified. Ascending aorta calcified. Aortic arch grossly normal.  •  IVC/SVC: Inferior vena cava is <2.1cm. Inferior vena cava collapses >50% during inspiration.  •  Pericardium: Normal structure.    Left ventricle appears to be normal in dimension and motion the estimated ejection fraction 60% there is diastolic dysfunction type I.  The left atrium is mildly enlarged.  The mitral valve sclerotic.  There is mild central mitral regurgitation.  The right ventricle is normal in dimension and motion.  The right atrium is mildly enlarged.  There is a closure device seen in the intra-atrial septum.  The aortic valve has 3 cusps it is sclerotic but opens normally.  The aortic root is mildly dilated at 4.1 cm it is also calcified mildly so.  The inferior vena cava is normal in dimension and motion.        US CAROTID BILATERAL 12/03/2020    Interpretation Summary  · There is less than 50% stenosis in the left and right internal carotid arteries with at most mild  heterogeneous plaque bilaterally.  · Antegrade vertebral flow bilaterally.      EKG 7/19/2023   Sinus  Rhythm 89bpm [id number]  -Cannot exclude prior inferior infarct.                                            ASSESSMENT AND PLAN   Mr. De La Garza is an 82-year-old man with the following issues:    Assessment/Plan    Hyperlipidemia  Atorvastatin 40 mg daily.  Blood work 1-2023 shows triglycerides 61 HDL 59 LDL 55.  Normal liver function tests    Atherosclerosis of coronary artery  Mild coronary artery calcification and aortic atherosclerosis noted on CT scan 2020.  Continue with aspirin and statin.  Unremarkable stress EKG 2021.  Low threshold for additional testing if patient develops symptoms.    Aortic aneurysm (CMS/HCC)  History of thoracic aortic aneurysm 4.1 cm on CT scan 2021.  Patient saw CT surgery in 2022 and was asked to follow-up in approximately 18 months and will continue with surveillance clinic there with imaging as directed by surgical surveillance clinic.              Return in about 6 months (around 1/19/2024).    Patient asked to follow-up with primary care physician regarding imaging recommendations for 4 mm right lung nodule done on CT scan 2021.     Thank you for allowing me to participate in the care of this patient.  I hope this information is helpful.         Duyen Gallego MD [facility]  7/19/2023  4:30 PM    This document was generated utilizing voice recognition technology. A reasonable attempt at proofreading has been made to minimize errors but please excuse any typographical errors which may be present. Please call with any questions.

## 2023-07-19 NOTE — LETTER
July 19, 2023     Armin Aaron Sr., DO  [address] [facility] [address]    Patient: Naif De La Garza  YOB: 1940  Date of Visit: 7/19/2023      Dear Dr. Aaron:    Thank you for referring Naif De La Garza to me for evaluation. Below are my notes for this consultation.    If you have questions, please do not hesitate to call me. I look forward to following your patient along with you.         Sincerely,        Duyen Gallego MD        CC: No Recipients    Dueyn Gallego MD  7/19/2023  4:31 PM  Signed       Cardiology Outpatient Note    [facility] Office  [address] [place] [address]     [facility]  The Heart [facility] Level  [address] [place] [address]     TEL  [phone]  [facility]     Naif De La Garza is a 82 y.o. male patient with history of hyyperlipidemia.  Patient previously saw Dr. Andrew Barrow.  Patient without history of  congestive heart failure, stroke, sleep apnea, myocardial infarction,  cardiac stenting, cardiac surgery, rheumatic heart disease, congenital heart disease, autoimmune disease, tuberculosis, cancer or cancer treatment, peripheral vascular disease, pulmonary embolus, or diabetes.  Prior notes state that patient has a history of paroxysmal atrial fibrillation but patient has no knowledge of this and denies this history.  He denies a history of TIA or stroke or ever being on anticoagulation.  Although listed in the chart, patient also denies a history of hypertension.    No history of major surgeries or blood transfusions.  Previous smoker and stopped in 1985.  No clear family history of accelerated coronary artery disease.    Patient had treadmill exercise stress test 2021 and exercised on Andrew protocol for 4 minutes 7 METS-unremarkable study.    Patient is currently asymptomatic.  No recent hospitalizations illnesses or COVID infections.                                                            [facility]     Medical History:  Past Medical History:   Diagnosis Date   • Aortic aneurysm (CMS/HCC)    • Coronary artery disease    • History of colon cancer    • Hyperlipidemia    • Lipid disorder        Surgical History:  Past Surgical History:   Procedure Laterality Date   • BUNIONECTOMY     • CATARACT EXTRACTION Bilateral 2023   • COLON SURGERY         Social History:  Social History     Tobacco Use   • Smoking status: Former     Packs/day: 1.00     Years: 15.00     Pack years: 15.00     Types: Cigarettes     Quit date:      Years since quittin.5   • Smokeless tobacco: Never   Vaping Use   • Vaping Use: Never used   Substance Use Topics   • Alcohol use: Yes     Comment: occasionally   • Drug use: No       Family History: He indicated that his biological mother is . He indicated that his biological father is .      Allergies:No known allergies    Current Medications:    Outpatient Encounter Medications as of 2023:   •  aspirin 81 mg enteric coated tablet, Take 81 mg by mouth daily.  •  atorvastatin (LIPITOR) 40 mg tablet, Take 1 tablet (40 mg total) by mouth daily.  •  triamcinolone (KENALOG) 0.1 % ointment, APPLY TO THE AFFECTED AREA(S) TWICE DAILY FOR UP TO TWO WEEKS AS NEEDED FOR eczema  •  ubidecarenone (CO Q-10 ORAL), Take 1 tablet by mouth daily.  •  vitamins  A,C,E-zinc-copper 14,320-226-200 unit-mg-unit capsule, 2 capsules daily                                                          OBJECTIVE   ROS as in HPI   Constitution: Negative for chills and fever.   Eyes: Negative for blurred vision and visual disturbance.   Cardiovascular: Negative for chest pain, dyspnea on exertion, near-syncope, palpitations and syncope.   Respiratory: Negative for hemoptysis, shortness of breath and wheezing.    Hematologic/Lymphatic: Negative for bleeding problem.   Skin: Negative for rash. No new skin changes  Gastrointestinal: Negative for abdominal pain, diarrhea, hematochezia,  "melena, nausea and vomiting.   Genitourinary: Negative for dysuria and hematuria.   Neurological: Negative for headaches.            Vitals:    07/19/23 1340 07/19/23 1347   BP: 120/70 118/70   BP Location: Right upper arm Right upper arm   Patient Position: Lying Sitting   Pulse: 89    Resp: 16    Weight: 100 kg (221 lb)    Height: 1.753 m (5' 9.02\")        BP Readings from Last 3 Encounters:   07/19/23 118/70   01/19/23 110/68   01/19/23 110/68     Wt Readings from Last 3 Encounters:   07/19/23 100 kg (221 lb)   01/19/23 99.8 kg (220 lb)   01/19/23 100 kg (220 lb 9.6 oz)       Physical Exam   Constitutional: Appears comfortable in no distress  HEENT:  Neck Supple.  No JVD No carotid bruits no cervical lymphadenopathy  Head: Normocephalic.   Eyes: Extraocular movements intact  Cardiovascular: Normal rate, regular rhythm no S3 gallop Exam reveals no friction rub.    Pulmonary/Chest: Effort normal and breath sounds normal. No wheezes.  Abdominal: Soft and nontender. Bowel sounds are normal.   Musculoskeletal: No lower extremity edema.   Neurological: Alert and oriented to person, place, and time.   Skin: Skin is warm and dry.   Psychiatric: Behavior is normal.            Objective   No results found for: CHOL  No results found for: HDL  No results found for: LDLCALC  No results found for: TRIG  No results found for: ALT, AST  No results found for: WBC, HGB, HCT, PLT, INR  Lab Results   Component Value Date    BUN 11 09/01/2022    CREATININE 0.78 09/01/2022     No results found for: HGBA1C  No results found for: TSH  No results found for: BNP  [unfilled]    Troponin I Results    No lab values to display.       No results found for: HSTROPONINI                                                       IMAGING     CT angiogram 7-  Heart and pericardium: Normal in size without effusion. Right left coronary  artery calcifications.  Vessels: Patent and normal.  Aortic annulus: 25 mm.  Sinus of Valsalva: 41 " mm.  Sinotubular junction: 34 mm.  Ascending aorta at level of main pulmonary artery: 41 mm.  Proximal descending, at the main pulmonary artery: 28 mm.  Diaphragmatic hiatus: 27 mm.     Amy and mediastinum: Normal without lymphadenopathy or mass.     Tracheobronchial tree: Patent.  Lungs:  Right: 4 mm right middle lobe nodule (series A3, image 62).  Left: No nodules or air space disease.  Both: Otherwise clear bilaterally.  Pleura: Normal without fluid or thickening.     Lower neck: Normal.  Chest wall and axilla : Normal.  Bones: Thoracic spondylosis without acute skeletal abnormality.     The fully visualized liver, gallbladder, stomach, spleen, and bilateral adrenal  glands are normal. The portions of the incompletely visualized pancreas,  kidneys, and small and large intestine are normal.    TRANSTHORACIC ECHO (TTE) COMPLETE 01/19/2023    Interpretation Summary  •  Left Ventricle: Normal ventricle size. Normal wall thickness. Muscle mass is normal. Estimated EF 60%. No regional wall motion abnormalities. Grade I diastolic dysfunction.  •  Right Ventricle: Normal ventricle size. Normal wall thickness.  •  Left Atrium: Mildly dilated atrium.  •  Right Atrium: Mildly dilated atrium.  •  Aortic Valve: Tricuspid valve.  Sclerotic leaflets.  •  Mitral Valve: Mitral annular calcification. Mild regurgitation.  •  Tricuspid Valve: Structure is grossly normal. Trace regurgitation.  •  Pulmonic Valve: Valve not well visualized.  •  Aorta: Aortic root calcified. Sinuses of Valsalva calcified. Ascending aorta calcified. Aortic arch grossly normal.  •  IVC/SVC: Inferior vena cava is <2.1cm. Inferior vena cava collapses >50% during inspiration.  •  Pericardium: Normal structure.    Left ventricle appears to be normal in dimension and motion the estimated ejection fraction 60% there is diastolic dysfunction type I.  The left atrium is mildly enlarged.  The mitral valve sclerotic.  There is mild central mitral  regurgitation.  The right ventricle is normal in dimension and motion.  The right atrium is mildly enlarged.  There is a closure device seen in the intra-atrial septum.  The aortic valve has 3 cusps it is sclerotic but opens normally.  The aortic root is mildly dilated at 4.1 cm it is also calcified mildly so.  The inferior vena cava is normal in dimension and motion.        US CAROTID BILATERAL 12/03/2020    Interpretation Summary  · There is less than 50% stenosis in the left and right internal carotid arteries with at most mild heterogeneous plaque bilaterally.  · Antegrade vertebral flow bilaterally.      EKG 7/19/2023   Sinus  Rhythm 89bpm [id number]  -Cannot exclude prior inferior infarct.                                            ASSESSMENT AND PLAN   Mr. De La Garza is an 82-year-old man with the following issues:    Assessment/Plan    Hyperlipidemia  Atorvastatin 40 mg daily.  Blood work 1-2023 shows triglycerides 61 HDL 59 LDL 55.  Normal liver function tests    Atherosclerosis of coronary artery  Mild coronary artery calcification and aortic atherosclerosis noted on CT scan 2020.  Continue with aspirin and statin.  Unremarkable stress EKG 2021.  Low threshold for additional testing if patient develops symptoms.    Aortic aneurysm (CMS/HCC)  History of thoracic aortic aneurysm 4.1 cm on CT scan 2021.  Patient saw CT surgery in 2022 and was asked to follow-up in approximately 18 months and will continue with surveillance clinic there with imaging as directed by surgical surveillance clinic.             Return in about 6 months (around 1/19/2024).         Thank you for allowing me to participate in the care of this patient.  I hope this information is helpful.         Duyen Gallego MD [facility]  7/19/2023  4:30 PM    This document was generated utilizing voice recognition technology. A reasonable attempt at proofreading has been made to minimize errors but please excuse any typographical errors which may be  present. Please call with any questions.

## 2024-01-24 ENCOUNTER — OFFICE VISIT (OUTPATIENT)
Dept: CARDIOLOGY | Facility: CLINIC | Age: 84
End: 2024-01-24
Payer: MEDICARE

## 2024-01-24 VITALS
RESPIRATION RATE: 16 BRPM | WEIGHT: 222.4 LBS | BODY MASS INDEX: 32.94 KG/M2 | SYSTOLIC BLOOD PRESSURE: 120 MMHG | HEART RATE: 68 BPM | HEIGHT: 69 IN | DIASTOLIC BLOOD PRESSURE: 70 MMHG

## 2024-01-24 DIAGNOSIS — I35.1 NONRHEUMATIC AORTIC VALVE INSUFFICIENCY: Primary | ICD-10-CM

## 2024-01-24 DIAGNOSIS — I10 PRIMARY HYPERTENSION: ICD-10-CM

## 2024-01-24 PROCEDURE — 93000 ELECTROCARDIOGRAM COMPLETE: CPT | Performed by: INTERNAL MEDICINE

## 2024-01-24 PROCEDURE — G8752 SYS BP LESS 140: HCPCS | Performed by: INTERNAL MEDICINE

## 2024-01-24 PROCEDURE — G8754 DIAS BP LESS 90: HCPCS | Performed by: INTERNAL MEDICINE

## 2024-01-24 PROCEDURE — 99214 OFFICE O/P EST MOD 30 MIN: CPT | Performed by: INTERNAL MEDICINE

## 2024-01-24 NOTE — ASSESSMENT & PLAN NOTE
History of thoracic aortic aneurysm 4.1 cm on CT scan 2021.  Patient saw CT surgery in 2022 and was asked to follow-up March 2024  (every 18mths) and will continue with surveillance clinic there with imaging as directed by surgical surveillance clinic.

## 2024-01-24 NOTE — PROGRESS NOTES
Cardiology Outpatient Note    [facility] HEART [facility] [place] Office  [address] [place] [address]     [facility]  The [facility] [address] [address] [facility]  [address] [place] [address]     TEL  [phone]  [facility]     Naif De La Garza is a 83 y.o. male patient with history of hyyperlipidemia.   Patient without history of  congestive heart failure, stroke, sleep apnea, myocardial infarction,  cardiac stenting, cardiac surgery, rheumatic heart disease, congenital heart disease, autoimmune disease, tuberculosis, cancer or cancer treatment, peripheral vascular disease, pulmonary embolus, hypertension or diabetes    No history of major surgeries or blood transfusions.  Previous smoker and stopped in .  No clear family history of accelerated coronary artery disease.    Patient had treadmill exercise stress test  and exercised on Andrew protocol for 4 minutes 7 METS-unremarkable study.    Patient is currently asymptomatic.  No recent hospitalizations illnesses or COVID infections.  Patient has been inactive because of plantar fasciitis but is hoping to increase his level of activity as the weather improves                                                             PMH     Medical History:  Past Medical History:   Diagnosis Date   • Aortic aneurysm (CMS/HCC)    • Coronary artery disease    • History of colon cancer    • Hyperlipidemia    • Lipid disorder        Surgical History:  Past Surgical History:   Procedure Laterality Date   • BUNIONECTOMY     • CATARACT EXTRACTION Bilateral 2023   • COLON SURGERY         Social History:  Social History     Tobacco Use   • Smoking status: Former     Packs/day: 1.00     Years: 15.00     Additional pack years: 0.00     Total pack years: 15.00     Types: Cigarettes     Quit date:      Years since quittin.0   • Smokeless tobacco: Never   Vaping Use   • Vaping Use: Never used   Substance Use Topics   •  "Alcohol use: Yes     Comment: occasionally   • Drug use: No       Family History: He indicated that his biological mother is . He indicated that his biological father is .      Allergies:No known allergies    Current Medications:    Outpatient Encounter Medications as of 2024:   •  aspirin 81 mg enteric coated tablet, Take 81 mg by mouth daily.  •  atorvastatin (LIPITOR) 40 mg tablet, Take 1 tablet (40 mg total) by mouth daily.  •  ubidecarenone (CO Q-10 ORAL), Take 1 tablet by mouth daily.  •  vitamins  A,C,E-zinc-copper 14,320-226-200 unit-mg-unit capsule, 2 capsules daily  •  triamcinolone (KENALOG) 0.1 % ointment, APPLY TO THE AFFECTED AREA(S) TWICE DAILY FOR UP TO TWO WEEKS AS NEEDED FOR eczema                                                          OBJECTIVE   ROS as in HPI   Constitution: Negative for chills and fever.   Eyes: Negative for blurred vision and visual disturbance.   Cardiovascular: Negative for chest pain, dyspnea on exertion, near-syncope, palpitations and syncope.   Respiratory: Negative for hemoptysis, shortness of breath and wheezing.    Hematologic/Lymphatic: Negative for bleeding problem.   Skin: Negative for rash. No new skin changes  Gastrointestinal: Negative for abdominal pain, diarrhea, hematochezia, melena, nausea and vomiting.   Genitourinary: Negative for dysuria and hematuria.   Neurological: Negative for headaches.            Vitals:    24 0917   BP: 120/70   BP Location: Left upper arm   Patient Position: Sitting   Pulse: 68   Resp: 16   Weight: 101 kg (222 lb 6.4 oz)   Height: 1.753 m (5' 9.02\")       BP Readings from Last 3 Encounters:   24 120/70   23 118/70   23 110/68     Wt Readings from Last 3 Encounters:   24 101 kg (222 lb 6.4 oz)   23 100 kg (221 lb)   23 99.8 kg (220 lb)       Physical Exam   Constitutional: Appears comfortable in no distress  HEENT:  Neck Supple.  No JVD No carotid bruits no cervical " "lymphadenopathy  Head: Normocephalic.   Eyes: Extraocular movements intact  Cardiovascular: Normal rate, regular rhythm no S3 gallop Exam reveals no friction rub.    Pulmonary/Chest: Effort normal and breath sounds normal. No wheezes.  Abdominal: Soft and nontender. Bowel sounds are normal.   Musculoskeletal: No lower extremity edema.   Neurological: Alert and oriented to person, place, and time.   Skin: Skin is warm and dry.   Psychiatric: Behavior is normal.            Objective   No results found for: \"CHOL\"  No results found for: \"HDL\"  No results found for: \"LDLCALC\"  No results found for: \"TRIG\"  No results found for: \"ALT\", \"AST\"  No results found for: \"WBC\", \"HGB\", \"HCT\", \"PLT\", \"INR\"  Lab Results   Component Value Date    BUN 11 09/01/2022    CREATININE 0.78 09/01/2022     No results found for: \"HGBA1C\"  No results found for: \"TSH\"  No results found for: \"BNP\"  [unfilled]    Troponin I Results    No lab values to display.       No results found for: \"HSTROPONINI\"                                                       IMAGING     CT angiogram 7-  Heart and pericardium: Normal in size without effusion. Right left coronary  artery calcifications.  Vessels: Patent and normal.  Aortic annulus: 25 mm.  Sinus of Valsalva: 41 mm.  Sinotubular junction: 34 mm.  Ascending aorta at level of main pulmonary artery: 41 mm.  Proximal descending, at the main pulmonary artery: 28 mm.  Diaphragmatic hiatus: 27 mm.     Amy and mediastinum: Normal without lymphadenopathy or mass.     Tracheobronchial tree: Patent.  Lungs:  Right: 4 mm right middle lobe nodule (series A3, image 62).  Left: No nodules or air space disease.  Both: Otherwise clear bilaterally.  Pleura: Normal without fluid or thickening.     Lower neck: Normal.  Chest wall and axilla : Normal.  Bones: Thoracic spondylosis without acute skeletal abnormality.     The fully visualized liver, gallbladder, stomach, spleen, and bilateral adrenal  glands are normal. " The portions of the incompletely visualized pancreas,  kidneys, and small and large intestine are normal.    TRANSTHORACIC ECHO (TTE) COMPLETE 01/19/2023    Interpretation Summary  •  Left Ventricle: Normal ventricle size. Normal wall thickness. Muscle mass is normal. Estimated EF 60%. No regional wall motion abnormalities. Grade I diastolic dysfunction.  •  Right Ventricle: Normal ventricle size. Normal wall thickness.  •  Left Atrium: Mildly dilated atrium.  •  Right Atrium: Mildly dilated atrium.  •  Aortic Valve: Tricuspid valve.  Sclerotic leaflets.  •  Mitral Valve: Mitral annular calcification. Mild regurgitation.  •  Tricuspid Valve: Structure is grossly normal. Trace regurgitation.  •  Pulmonic Valve: Valve not well visualized.  •  Aorta: Aortic root calcified. Sinuses of Valsalva calcified. Ascending aorta calcified. Aortic arch grossly normal.  •  IVC/SVC: Inferior vena cava is <2.1cm. Inferior vena cava collapses >50% during inspiration.  •  Pericardium: Normal structure.    Left ventricle appears to be normal in dimension and motion the estimated ejection fraction 60% there is diastolic dysfunction type I.  The left atrium is mildly enlarged.  The mitral valve sclerotic.  There is mild central mitral regurgitation.  The right ventricle is normal in dimension and motion.  The right atrium is mildly enlarged.  There is a closure device seen in the intra-atrial septum.  The aortic valve has 3 cusps it is sclerotic but opens normally.  The aortic root is mildly dilated at 4.1 cm it is also calcified mildly so.  The inferior vena cava is normal in dimension and motion.        US CAROTID BILATERAL 12/03/2020    Interpretation Summary  · There is less than 50% stenosis in the left and right internal carotid arteries with at most mild heterogeneous plaque bilaterally.  · Antegrade vertebral flow bilaterally.      EKG  Sinus  Rhythm 89bpm [id number]  -Cannot exclude prior inferior infarct.                                             ASSESSMENT AND PLAN   Mr. De La Garza is an 82-year-old man with the following issues:    Assessment/Plan    Atherosclerosis of coronary artery  Mild coronary artery calcification and aortic atherosclerosis noted on CT scan 2020.  Continue with aspirin and statin.  Unremarkable stress EKG 2021.  Low threshold for additional testing if patient develops symptoms.    Aortic aneurysm (CMS/HCC)  History of thoracic aortic aneurysm 4.1 cm on CT scan 2021.  Patient saw CT surgery in 2022 and was asked to follow-up March 2024  (every 18mths) and will continue with surveillance clinic there with imaging as directed by surgical surveillance clinic.    Hyperlipidemia  Atorvastatin 40 mg daily.  Blood work 1-2023 shows triglycerides 61 HDL 59 LDL 55.  Normal liver function tests              Return in about 6 months (around 7/24/2024).         Thank you for allowing me to participate in the care of this patient.  I hope this information is helpful.         Duyen Gallego MD [facility]  1/24/2024  10:13 AM    This document was generated utilizing voice recognition technology. A reasonable attempt at proofreading has been made to minimize errors but please excuse any typographical errors which may be present. Please call with any questions.

## 2024-01-24 NOTE — LETTER
January 24, 2024     Armin Aaron Sr., DO  [address] [facility] [address]    Patient: Naif De La Garza  YOB: 1940  Date of Visit: 1/24/2024      Dear Dr. Aaron:    Thank you for referring Naif De La Garza to me for evaluation. Below are my notes for this consultation.    If you have questions, please do not hesitate to call me. I look forward to following your patient along with you.         Sincerely,        Duyen Gallego MD        CC: No Recipients    Duyen Gallego MD  1/24/2024 11:37 AM  Signed       Cardiology Outpatient Note    [facility] Office  [address] [id number]     [facility]  The [facility] Level  [address] [place] [address]     TEL  [phone]  [facility]hc     Naif De La Garza is a 83 y.o. male patient with history of hyyperlipidemia.   Patient without history of  congestive heart failure, stroke, sleep apnea, myocardial infarction,  cardiac stenting, cardiac surgery, rheumatic heart disease, congenital heart disease, autoimmune disease, tuberculosis, cancer or cancer treatment, peripheral vascular disease, pulmonary embolus, hypertension or diabetes    No history of major surgeries or blood transfusions.  Previous smoker and stopped in 1985.  No clear family history of accelerated coronary artery disease.    Patient had treadmill exercise stress test 2021 and exercised on Andrew protocol for 4 minutes 7 METS-unremarkable study.    Patient is currently asymptomatic.  No recent hospitalizations illnesses or COVID infections.  Patient has been inactive because of plantar fasciitis but is hoping to increase his level of activity as the weather improves                                                             PMH     Medical History:  Past Medical History:   Diagnosis Date   • Aortic aneurysm (CMS/HCC)    • Coronary artery disease    • History of colon cancer    • Hyperlipidemia    •  Lipid disorder        Surgical History:  Past Surgical History:   Procedure Laterality Date   • BUNIONECTOMY     • CATARACT EXTRACTION Bilateral 2023   • COLON SURGERY         Social History:  Social History     Tobacco Use   • Smoking status: Former     Packs/day: 1.00     Years: 15.00     Additional pack years: 0.00     Total pack years: 15.00     Types: Cigarettes     Quit date:      Years since quittin.0   • Smokeless tobacco: Never   Vaping Use   • Vaping Use: Never used   Substance Use Topics   • Alcohol use: Yes     Comment: occasionally   • Drug use: No       Family History: He indicated that his biological mother is . He indicated that his biological father is .      Allergies:No known allergies    Current Medications:    Outpatient Encounter Medications as of 2024:   •  aspirin 81 mg enteric coated tablet, Take 81 mg by mouth daily.  •  atorvastatin (LIPITOR) 40 mg tablet, Take 1 tablet (40 mg total) by mouth daily.  •  ubidecarenone (CO Q-10 ORAL), Take 1 tablet by mouth daily.  •  vitamins  A,C,E-zinc-copper 14,320-226-200 unit-mg-unit capsule, 2 capsules daily  •  triamcinolone (KENALOG) 0.1 % ointment, APPLY TO THE AFFECTED AREA(S) TWICE DAILY FOR UP TO TWO WEEKS AS NEEDED FOR eczema                                                          OBJECTIVE   ROS as in HPI   Constitution: Negative for chills and fever.   Eyes: Negative for blurred vision and visual disturbance.   Cardiovascular: Negative for chest pain, dyspnea on exertion, near-syncope, palpitations and syncope.   Respiratory: Negative for hemoptysis, shortness of breath and wheezing.    Hematologic/Lymphatic: Negative for bleeding problem.   Skin: Negative for rash. No new skin changes  Gastrointestinal: Negative for abdominal pain, diarrhea, hematochezia, melena, nausea and vomiting.   Genitourinary: Negative for dysuria and hematuria.   Neurological: Negative for headaches.            Vitals:     "01/24/24 0917   BP: 120/70   BP Location: Left upper arm   Patient Position: Sitting   Pulse: 68   Resp: 16   Weight: 101 kg (222 lb 6.4 oz)   Height: 1.753 m (5' 9.02\")       BP Readings from Last 3 Encounters:   01/24/24 120/70   07/19/23 118/70   01/19/23 110/68     Wt Readings from Last 3 Encounters:   01/24/24 101 kg (222 lb 6.4 oz)   07/19/23 100 kg (221 lb)   01/19/23 99.8 kg (220 lb)       Physical Exam   Constitutional: Appears comfortable in no distress  HEENT:  Neck Supple.  No JVD No carotid bruits no cervical lymphadenopathy  Head: Normocephalic.   Eyes: Extraocular movements intact  Cardiovascular: Normal rate, regular rhythm no S3 gallop Exam reveals no friction rub.    Pulmonary/Chest: Effort normal and breath sounds normal. No wheezes.  Abdominal: Soft and nontender. Bowel sounds are normal.   Musculoskeletal: No lower extremity edema.   Neurological: Alert and oriented to person, place, and time.   Skin: Skin is warm and dry.   Psychiatric: Behavior is normal.            Objective   No results found for: \"CHOL\"  No results found for: \"HDL\"  No results found for: \"LDLCALC\"  No results found for: \"TRIG\"  No results found for: \"ALT\", \"AST\"  No results found for: \"WBC\", \"HGB\", \"HCT\", \"PLT\", \"INR\"  Lab Results   Component Value Date    BUN 11 09/01/2022    CREATININE 0.78 09/01/2022     No results found for: \"HGBA1C\"  No results found for: \"TSH\"  No results found for: \"BNP\"  [unfilled]    Troponin I Results    No lab values to display.       No results found for: \"HSTROPONINI\"                                                       IMAGING     CT angiogram 7-  Heart and pericardium: Normal in size without effusion. Right left coronary  artery calcifications.  Vessels: Patent and normal.  Aortic annulus: 25 mm.  Sinus of Valsalva: 41 mm.  Sinotubular junction: 34 mm.  Ascending aorta at level of main pulmonary artery: 41 mm.  Proximal descending, at the main pulmonary artery: 28 mm.  Diaphragmatic " hiatus: 27 mm.     Amy and mediastinum: Normal without lymphadenopathy or mass.     Tracheobronchial tree: Patent.  Lungs:  Right: 4 mm right middle lobe nodule (series A3, image 62).  Left: No nodules or air space disease.  Both: Otherwise clear bilaterally.  Pleura: Normal without fluid or thickening.     Lower neck: Normal.  Chest wall and axilla : Normal.  Bones: Thoracic spondylosis without acute skeletal abnormality.     The fully visualized liver, gallbladder, stomach, spleen, and bilateral adrenal  glands are normal. The portions of the incompletely visualized pancreas,  kidneys, and small and large intestine are normal.    TRANSTHORACIC ECHO (TTE) COMPLETE 01/19/2023    Interpretation Summary  •  Left Ventricle: Normal ventricle size. Normal wall thickness. Muscle mass is normal. Estimated EF 60%. No regional wall motion abnormalities. Grade I diastolic dysfunction.  •  Right Ventricle: Normal ventricle size. Normal wall thickness.  •  Left Atrium: Mildly dilated atrium.  •  Right Atrium: Mildly dilated atrium.  •  Aortic Valve: Tricuspid valve.  Sclerotic leaflets.  •  Mitral Valve: Mitral annular calcification. Mild regurgitation.  •  Tricuspid Valve: Structure is grossly normal. Trace regurgitation.  •  Pulmonic Valve: Valve not well visualized.  •  Aorta: Aortic root calcified. Sinuses of Valsalva calcified. Ascending aorta calcified. Aortic arch grossly normal.  •  IVC/SVC: Inferior vena cava is <2.1cm. Inferior vena cava collapses >50% during inspiration.  •  Pericardium: Normal structure.    Left ventricle appears to be normal in dimension and motion the estimated ejection fraction 60% there is diastolic dysfunction type I.  The left atrium is mildly enlarged.  The mitral valve sclerotic.  There is mild central mitral regurgitation.  The right ventricle is normal in dimension and motion.  The right atrium is mildly enlarged.  There is a closure device seen in the intra-atrial septum.  The aortic  valve has 3 cusps it is sclerotic but opens normally.  The aortic root is mildly dilated at 4.1 cm it is also calcified mildly so.  The inferior vena cava is normal in dimension and motion.        US CAROTID BILATERAL 12/03/2020    Interpretation Summary  · There is less than 50% stenosis in the left and right internal carotid arteries with at most mild heterogeneous plaque bilaterally.  · Antegrade vertebral flow bilaterally.      EKG  Sinus  Rhythm 89bpm [id number]  -Cannot exclude prior inferior infarct.                                            ASSESSMENT AND PLAN   Mr. De La Garza is an 82-year-old man with the following issues:    Assessment/Plan    Atherosclerosis of coronary artery  Mild coronary artery calcification and aortic atherosclerosis noted on CT scan 2020.  Continue with aspirin and statin.  Unremarkable stress EKG 2021.  Low threshold for additional testing if patient develops symptoms.    Aortic aneurysm (CMS/HCC)  History of thoracic aortic aneurysm 4.1 cm on CT scan 2021.  Patient saw CT surgery in 2022 and was asked to follow-up March 2024  (every 18mths) and will continue with surveillance clinic there with imaging as directed by surgical surveillance clinic.    Hyperlipidemia  Atorvastatin 40 mg daily.  Blood work 1-2023 shows triglycerides 61 HDL 59 LDL 55.  Normal liver function tests             Return in about 6 months (around 7/24/2024).         Thank you for allowing me to participate in the care of this patient.  I hope this information is helpful.         Duyen Gallego MD [facility]  1/24/2024  10:13 AM    This document was generated utilizing voice recognition technology. A reasonable attempt at proofreading has been made to minimize errors but please excuse any typographical errors which may be present. Please call with any questions.

## 2024-02-08 ENCOUNTER — TELEPHONE (OUTPATIENT)
Dept: SCHEDULING | Age: 84
End: 2024-02-08
Payer: MEDICARE

## 2024-02-08 DIAGNOSIS — I71.21 ASCENDING AORTIC ANEURYSM, UNSPECIFIED WHETHER RUPTURED (CMS/HCC): Primary | ICD-10-CM

## 2024-02-08 DIAGNOSIS — I71.21 ANEURYSM OF ASCENDING AORTA WITHOUT RUPTURE (CMS/HCC): Primary | ICD-10-CM

## 2024-02-08 NOTE — TELEPHONE ENCOUNTER
Patient has upcoming appointment. Patient needs CTA chest. Please enter the order ans labs.         Thank you :)

## 2024-02-27 ASSESSMENT — ENCOUNTER SYMPTOMS
PSYCHIATRIC NEGATIVE: 1
ALLERGIC/IMMUNOLOGIC NEGATIVE: 1
NEUROLOGICAL NEGATIVE: 1
ENDOCRINE NEGATIVE: 1
EYES NEGATIVE: 1
CONSTITUTIONAL NEGATIVE: 1
RESPIRATORY NEGATIVE: 1
GASTROINTESTINAL NEGATIVE: 1

## 2024-02-27 NOTE — PROGRESS NOTES
[facility]  Dr. Jose Phan- System Chief Cardiothoracic Surgery  JUDITH Buckner - Aortic   [facility]  [phone]         Reason for visit:   Chief Complaint   Patient presents with   • Follow-up      HPI    Naif De La Garza is a 83 y.o. male who presents for approximate follow-up of a known ascending aortic aneurysm.  He was initially seen in our aortic clinic on 7/15/2021 and last seen on 9/9/22.      At the time of his last visit his ascending aorta measured 4.1 cm and his aortic root measured 4.1cm without evidence of dissection. He was asked to return in 18 months with a repeat gated CTA of the chest.      His past medical history is significant for hypertension, HLD, and mild aortic insufficiency, and colon cancer. No history of major surgeries or blood transfusions.  Previous smoker and stopped in 1985. He was following with Dr Barrow for cardiovascular care who recently retired.  His care was transitioned to Dr Gallego who he last saw on 1/24/2024.    He returns today with repeat testing.  Since being seen last he reports that he underwent cataract surgery. He has had no hospitalizations. He states that he been dealing with plantar fascitis and has been unable to walk like he had been for exercise so he feels like he has gotten out of shape and does get winded faster. No SOB at rest or going up steps. He recently bought a rower machine on [employer] and is planning on initiating this as exercise.  He lives with his wife.  He is retired .     The patient currently denies chest pain, shortness of breath, palpitations, lightheadedness or syncope.  He is a retired .  He reports a family history of CAD in his brother and sister and his father had an abdominal aneurysm.  Patient continues to perform normal daily activities without limitation.     Past Medical History:   Diagnosis Date   • Aortic aneurysm (CMS/HCC)    • Coronary  artery disease    • History of colon cancer    • Hyperlipidemia    • Lipid disorder      Past Surgical History:   Procedure Laterality Date   • BUNIONECTOMY     • CATARACT EXTRACTION Bilateral 2023   • COLON SURGERY       Quinolones  Current Outpatient Medications   Medication Sig Dispense Refill   • aspirin 81 mg enteric coated tablet Take 81 mg by mouth daily.     • atorvastatin (LIPITOR) 40 mg tablet Take 1 tablet (40 mg total) by mouth daily. 90 tablet 3   • triamcinolone (KENALOG) 0.1 % ointment APPLY TO THE AFFECTED AREA(S) TWICE DAILY FOR UP TO TWO WEEKS AS NEEDED FOR eczema  3   • ubidecarenone (CO Q-10 ORAL) Take 1 tablet by mouth daily.     • vitamins  A,C,E-zinc-copper 14,320-226-200 unit-mg-unit capsule 2 capsules daily       No current facility-administered medications for this visit.     Social History     Socioeconomic History   • Marital status:      Spouse name: None   • Number of children: None   • Years of education: None   • Highest education level: None   Tobacco Use   • Smoking status: Former     Packs/day: 1.00     Years: 15.00     Additional pack years: 0.00     Total pack years: 15.00     Types: Cigarettes     Quit date:      Years since quittin.2   • Smokeless tobacco: Never   Vaping Use   • Vaping Use: Never used   Substance and Sexual Activity   • Alcohol use: Yes     Comment: occasionally   • Drug use: No   • Sexual activity: Defer     Family History   Problem Relation Age of Onset   • Breast cancer Biological Mother    • Stomach cancer Biological Father         Current Outpatient Medications:   •  aspirin 81 mg enteric coated tablet, Take 81 mg by mouth daily., Disp: , Rfl:   •  atorvastatin (LIPITOR) 40 mg tablet, Take 1 tablet (40 mg total) by mouth daily., Disp: 90 tablet, Rfl: 3  •  triamcinolone (KENALOG) 0.1 % ointment, APPLY TO THE AFFECTED AREA(S) TWICE DAILY FOR UP TO TWO WEEKS AS NEEDED FOR eczema, Disp: , Rfl: 3  •  ubidecarenone (CO Q-10 ORAL), Take 1  tablet by mouth daily., Disp: , Rfl:   •  vitamins  A,C,E-zinc-copper 14,320-226-200 unit-mg-unit capsule, 2 capsules daily, Disp: , Rfl:     Review of Systems   Constitutional: Negative.   HENT: Negative.    Eyes: Negative.    Cardiovascular: Negative for chest pain.   Respiratory: Negative.    Endocrine: Negative.    Skin: Negative.    Musculoskeletal: Positive for arthritis and back pain (lower back pain).   Gastrointestinal: Negative.    Genitourinary: Negative.    Neurological: Negative.    Psychiatric/Behavioral: Negative.    Allergic/Immunologic: Negative.       Objective    Vitals:    03/08/24 1058   BP: 126/72   Pulse: (!) 111   Resp: 18   Temp: (!) 36.1 °C (96.9 °F)   SpO2: 94%   repeat Hear rate    Physical Exam  Constitutional:       General: He is not in acute distress.     Appearance: He is well-developed.   HENT:      Head: Normocephalic and atraumatic.      Right Ear: External ear normal.      Left Ear: External ear normal.      Nose: Nose normal.      Mouth/Throat:      Pharynx: No oropharyngeal exudate.   Eyes:      General: No scleral icterus.        Right eye: No discharge.      Conjunctiva/sclera: Conjunctivae normal.      Pupils: Pupils are equal, round, and reactive to light.   Cardiovascular:      Rate and Rhythm: Regular rhythm. Tachycardia present.      Pulses: Intact distal pulses.      Heart sounds: S1 normal and S2 normal.   Pulmonary:      Effort: Pulmonary effort is normal. No respiratory distress.      Breath sounds: Normal breath sounds. No wheezing or rales.   Chest:      Chest wall: No tenderness.   Abdominal:      General: Bowel sounds are normal. There is no distension.      Palpations: Abdomen is soft. There is no mass.      Tenderness: There is no abdominal tenderness. There is no guarding or rebound.   Musculoskeletal:         General: No tenderness or deformity. Normal range of motion.      Cervical back: Normal range of motion and neck supple.   Skin:     General: Skin  is warm and dry.      Coloration: Skin is not pale.      Findings: No erythema or rash.   Neurological:      Mental Status: He is alert and oriented to person, place, and time.      Deep Tendon Reflexes: Reflexes are normal and symmetric.   Psychiatric:         Behavior: Behavior normal.         Thought Content: Thought content normal.         Judgment: Judgment normal.                 Imaging:  CTA Chest 3/8/24: Discussed finding of 4mm nodule, this has been stable over many Ct scans per patient but he has not had any follow up. Will discuss with lung navigator team, message sent.     CLINICAL HISTORY: Follow up thoracic aortic aneurysm     STUDY: CTA examination of the chest was performed following the uneventful  administration of 100 cc of Isovue 370.  Precontrast images were obtained.     3D MIP and/or volume rendered image reconstruction performed and reviewed.     CT DOSE:  One or more dose reduction techniques (e.g. automated exposure  control, adjustment of the mA and/or kV according to patient size, use of  iterative reconstruction technique) utilized for this examination.     COMPARISON: 2021 and 2022     COMMENT:     LOWER NECK: unremarkable     HEART AND PERICARDIUM: The heart is stable in size.  Moderate coronary artery  calcifications are noted. No pericardial effusion.     MEDIASTINUM AND TYREE:  no significant adenopathy     AORTA:     Aortic annulus: 25 mm (20-31 mm).  Previously 25 mm  Sinus of Valsalva Diameter: 41 mm. (29-45 mm).  Previously 41 m  Sinotubular Junction Diameter:  32 mm. (22-26 mm).  Previously 34 mm  Ascending thoracic aorta maximum diameter: 40 mm. (22-36 mm).  Previously 41 mm  Proximal descendin mm (20-30 mm).  Previously 28 mm.  Descending thoracic aorta at diaphragmatic hiatus: 26 mm. (20-30 mm).  Previously 27 mm     There is mild atherosclerotic vascular disease.  No evidence for hematoma or  dissection.        LUNG PARENCHYMA:  Stable 4 mm right middle  lobe nodule (series A3, #60).  Emphysematous changes are noted.  There is some peripheral reticulation  suggesting underlying scar.     AXILLA: no significant adenopathy     CHEST WALL:  no significant abnormality     UPPER ABDOMEN: no significant abnormality     BONES: Degenerative change of the spine.     --  IMPRESSION:     1. Stable ascending thoracic aortic ectasia.  2. Stable right lung nodule.     Fleischner Society 2017 Guidelines for Management of Incidentally Detected  Pulmonary Nodules in Adults. (Solid Nodules)     Nodule size <6 mm (single)  Low-Risk Patient - No routine follow-up  High-Risk Patient - Optional CT at 12 months     CC: LUNG NAVIGATOR      TTE 1/19/2023    Left Ventricle: Normal ventricle size. Normal wall thickness. Muscle mass is normal. Estimated EF 60%. No regional wall motion abnormalities. Grade I diastolic dysfunction.  •  Right Ventricle: Normal ventricle size. Normal wall thickness.  •  Left Atrium: Mildly dilated atrium.  •  Right Atrium: Mildly dilated atrium.  •  Aortic Valve: Tricuspid valve.  Sclerotic leaflets.  •  Mitral Valve: Mitral annular calcification. Mild regurgitation.  •  Tricuspid Valve: Structure is grossly normal. Trace regurgitation.  •  Pulmonic Valve: Valve not well visualized.  •  Aorta: Aortic root calcified. Sinuses of Valsalva calcified. Ascending aorta calcified. Aortic arch grossly normal.  •  IVC/SVC: Inferior vena cava is <2.1cm. Inferior vena cava collapses >50% during inspiration.  •  Pericardium: Normal structure.     Left ventricle appears to be normal in dimension and motion the estimated ejection fraction 60% there is diastolic dysfunction type I.  The left atrium is mildly enlarged.  The mitral valve sclerotic.  There is mild central mitral regurgitation.  The right ventricle is normal in dimension and motion.  The right atrium is mildly enlarged.  There is a closure device seen in the intra-atrial septum.  The aortic valve has 3 cusps it is  sclerotic but opens normally.  The aortic root is mildly dilated at 4.1 cm it is also calcified mildly so.  The inferior vena cava is normal in dimension and motion.       BMP Results       02/29/24 09/01/22     0920 1034    BUN 10 11    Creatinine 0.70 0.78    EGFR 93 91            Assessment/Plan    Mr. De La Garza is an 83 year old male who presents for 18 month follow up of a stable ascending aortic aneurysm that remains unchanged in size measuring 4.1cm at the level of the pulmonary artery in the axial plane without evidence of dissection.  The aortic root at the sinus of valsalva also remains unchanged measuring 4.1 cm in the coronal plane.  At this time the patient denies chest pain, palpitations, lightheadedness or shortness of breath.     His blood pressure is at goal, he does not smoke, He is mildly tachycardic in the office today, after sitting and talking to me his heart rate was improved in the high 90's to low 100's.  It is regular.  He denies palpitations.  He states that he only had coffee this am and did not drink water and had just eating a breakfast sandwich on his way here. We dicussed if he would start feeling palpitations, dizziness, diaphoresis  or felt like his heart was racing he would need to call his cardiologist or go to the ED. He stated that he felt fine.     His most recent TTE was one year ago, which showed a tricuspid AV, normal EF and sclerotic leaflets.  TTE surveillance can be directed by Dr Gallego.     I have advised him to continue following regularly with Dr. Gallego or his cardiovascular care.  I would like to see him in 18 months with a repeat gated CTA of the chest.  In the meantime he was advised to contact us with any questions or concerns.    Based on STS guidelines discussed below patient is not yet at trigger for surgical intervention.      Aorta/Valve Guidelines  Data   Asc. (mm)  41  Root (mm)  41  Past asc. (mm)  41  Height    cm 175.2 Weight   kg 101 BSA   @BSA@    Calculations   Ratio Surface/Height 8.69   Aortic Size Index (Size/Body Surface Area) 2.04   2010 ACC/AHA/AATS Guidelines on aortic disease:    2014 ACC/AHA guidelines for valvular disease:   2014 CCS guidelines for thoracic aortic disease:    2014 ESC guidelines for thoracic aortic disease: No recommendation     No recommendation    No recommendation       Lower threshold for intervention may be considered in patients of small stature, rapid growth, patient preference, aortic valve regurgitation or planned pregnancy       Surgical intervention is recommended when the risk of rupture and/or dissection is greater that the natural history of the disease.    The surgical triggers for an ascending aortic aneurysm include:  1. >4.5 cm in the setting of a family history positive for rupture or dissection  2. >5.0cm for Marfan's with a negative family history of rupture/dissection  3. >5.0 cm in the setting of a bicuspid aortic valve and/or moderate or worse aortic valve disease with increased left ventricular diameters and decreased left ventricular function  4. >5.5 cm regardless of aortic valve function and/or acute or chronic dissection  5. Growth of greater than 3mm in one year.  6. Primary valve failure    *In some cases, given the surgical risk, the patient may be allowed to reach a larger aortic diameter prior to surgical intervention.     Valvular indications for surgery:   1. Development of severe AI   2. LVEF less than 50%   3. LVEDD> 6.0cm or LVESD>4.2cm   4. Development of Class II or worse CHF     Recommendations for management of aortic disease were reviewed in detail today and include:  1. All patients with aortic disease will require lifelong screening and surveillance in a dedicated aortic clinic.   2. Maintaining good blood pressure control <130/80 mmHg is crucial to managing aortic disease.  Patient was advised to follow regularly with their cardiologist/PCP  3. Exercise is important! Moderate  intensity exercise is recommended and has great benefit on blood pressure control. Avoid grunt-level lifting, including isometric exercise, not to exceed 40lbs.  Heavy lifting or straining causes brief spikes in blood pressure, which we are hoping to avoid.    4. Avoid fluoroquinolone antibiotics given associated increased risk of aortic aneurysm and dissection in high risk patients.   5. Avoid all tobacco use, including cigarette smoking.  6. Avoid illicit drugs (ie cocaine, etc).  7. Screening for aortic disease is recommended in all first order relatives (parents, siblings, children) with echocardiogram, CT or MRI which should be coordinated through their medical providers.   8. In the unlikely event of sudden onset of chest pain or pain that radiates to the upper back, they were advised to report to the closest ED and have our service notifi          Thank you for allowing me to participate in the care of this patient.  I hope this information is helpful.     JUDITH Payton 3/8/2024  12:00 PM

## 2024-03-01 LAB
BUN SERPL-MCNC: 10 MG/DL (ref 8–27)
CREAT SERPL-MCNC: 0.7 MG/DL (ref 0.76–1.27)
EGFRCR SERPLBLD CKD-EPI 2021: 93 ML/MIN/1.73

## 2024-03-08 ENCOUNTER — HOSPITAL ENCOUNTER (OUTPATIENT)
Dept: RADIOLOGY | Facility: HOSPITAL | Age: 84
Discharge: HOME | End: 2024-03-08
Attending: NURSE PRACTITIONER
Payer: MEDICARE

## 2024-03-08 ENCOUNTER — OFFICE VISIT (OUTPATIENT)
Dept: CARDIOTHORACIC SURGERY | Facility: CLINIC | Age: 84
End: 2024-03-08
Payer: MEDICARE

## 2024-03-08 VITALS
OXYGEN SATURATION: 94 % | BODY MASS INDEX: 32.85 KG/M2 | RESPIRATION RATE: 18 BRPM | DIASTOLIC BLOOD PRESSURE: 72 MMHG | HEIGHT: 69 IN | HEART RATE: 111 BPM | SYSTOLIC BLOOD PRESSURE: 126 MMHG | TEMPERATURE: 96.9 F | WEIGHT: 221.8 LBS

## 2024-03-08 DIAGNOSIS — I71.21 ANEURYSM OF ASCENDING AORTA WITHOUT RUPTURE (CMS/HCC): ICD-10-CM

## 2024-03-08 DIAGNOSIS — I71.21 ANEURYSM OF ASCENDING AORTA WITHOUT RUPTURE (CMS/HCC): Primary | ICD-10-CM

## 2024-03-08 PROCEDURE — 63600105 HC IODINE BASED CONTRAST: Mod: JZ | Performed by: NURSE PRACTITIONER

## 2024-03-08 PROCEDURE — 71275 CT ANGIOGRAPHY CHEST: CPT

## 2024-03-08 PROCEDURE — 99214 OFFICE O/P EST MOD 30 MIN: CPT | Performed by: NURSE PRACTITIONER

## 2024-03-08 RX ORDER — IOPAMIDOL 755 MG/ML
100 INJECTION, SOLUTION INTRAVASCULAR
Status: COMPLETED | OUTPATIENT
Start: 2024-03-08 | End: 2024-03-08

## 2024-03-08 RX ADMIN — IOPAMIDOL 100 ML: 755 INJECTION, SOLUTION INTRAVENOUS at 09:48

## 2024-03-08 ASSESSMENT — ENCOUNTER SYMPTOMS: BACK PAIN: 1

## 2024-03-08 NOTE — PATIENT INSTRUCTIONS
The following recommendations were made to the patient: Do not lift in excess of 40 pounds.  Heavy lifting or straining causes brief spikes in blood pressure, which we would like to avoid.  The patient was also counseled on exercise parameters and was advised low impact cardiovascular exercise is ok and may do light weightlifting.  We have recommended that the patient monitor their heart rate during exercise.  The patient was also counseled on the importance of maintaining good blood pressure control and was advised to follow regularly with their cardiologist/PCP. Given the FDA safety statement regarding the use of Fluoroquinolones (increased risk of aneurysmal growth or aortic dissection) in patients with aortic disease, they were advised to avoid this class of antibiotics when possible.  In the unlikely event of sudden onset of chest pain or pain that radiates to the upper back, they were advised to report to the closest ED and have our service notified.

## 2024-04-16 RX ORDER — ATORVASTATIN CALCIUM 40 MG/1
40 TABLET, FILM COATED ORAL DAILY
Qty: 90 TABLET | Refills: 3 | Status: SHIPPED | OUTPATIENT
Start: 2024-04-16

## 2024-07-24 ENCOUNTER — OFFICE VISIT (OUTPATIENT)
Dept: CARDIOLOGY | Facility: CLINIC | Age: 84
End: 2024-07-24
Payer: MEDICARE

## 2024-07-24 VITALS
SYSTOLIC BLOOD PRESSURE: 120 MMHG | HEART RATE: 73 BPM | DIASTOLIC BLOOD PRESSURE: 60 MMHG | WEIGHT: 222 LBS | BODY MASS INDEX: 32.88 KG/M2 | RESPIRATION RATE: 16 BRPM | HEIGHT: 69 IN

## 2024-07-24 DIAGNOSIS — I35.1 NONRHEUMATIC AORTIC VALVE INSUFFICIENCY: Primary | ICD-10-CM

## 2024-07-24 LAB
ATRIAL RATE: 73
P AXIS: 48
PR INTERVAL: 190
QRS DURATION: 92
QT INTERVAL: 392
QTC CALCULATION(BAZETT): 431
R AXIS: 0
T WAVE AXIS: 19
VENTRICULAR RATE: 73

## 2024-07-24 PROCEDURE — 93000 ELECTROCARDIOGRAM COMPLETE: CPT | Performed by: INTERNAL MEDICINE

## 2024-07-24 PROCEDURE — 99214 OFFICE O/P EST MOD 30 MIN: CPT | Performed by: INTERNAL MEDICINE

## 2024-07-24 RX ORDER — CHOLECALCIFEROL (VITAMIN D3) 25 MCG
1000 TABLET ORAL DAILY
COMMUNITY

## 2024-07-24 NOTE — PROGRESS NOTES
Cardiology Outpatient Note    [facility]    [facility] Office  [address] [place] [address]     [facility]  The Heart [address]  [address] [place] [address]     TEL  [phone]  [facility]     Naif De La Garza is a 83 y.o. male patient with history of hyyperlipidemia.   Patient without history of  congestive heart failure, stroke, sleep apnea, myocardial infarction,  cardiac stenting, cardiac surgery, rheumatic heart disease, congenital heart disease, autoimmune disease, tuberculosis, cancer or cancer treatment, peripheral vascular disease, pulmonary embolus, hypertension or diabetes    No history of major surgeries or blood transfusions.  Previous smoker and stopped in .  No clear family history of accelerated coronary artery disease.    Patient had treadmill exercise stress test  and exercised on Andrew protocol for 4 minutes 7 METS-unremarkable study.    Patient is currently asymptomatic.  No recent hospitalizations illnesses or COVID infections.  Patient has been inactive because of plantar fasciitis .                                                           PMH     Medical History:  Past Medical History:   Diagnosis Date    Aortic aneurysm (CMS/HCC)     Coronary artery disease     History of colon cancer     Hyperlipidemia     Lipid disorder        Surgical History:  Past Surgical History:   Procedure Laterality Date    BUNIONECTOMY  2009    CATARACT EXTRACTION Bilateral 2023    COLON SURGERY         Social History:  Social History     Tobacco Use    Smoking status: Former     Packs/day: 1.00     Years: 15.00     Additional pack years: 0.00     Total pack years: 15.00     Types: Cigarettes     Quit date:      Years since quittin.5    Smokeless tobacco: Never   Vaping Use    Vaping Use: Never used   Substance Use Topics    Alcohol use: Yes     Comment: occasionally    Drug use: No       Family History: He  "indicated that his biological mother is . He indicated that his biological father is .      Allergies:Quinolones    Current Medications:    Outpatient Encounter Medications as of 2024:     aspirin 81 mg enteric coated tablet, Take 81 mg by mouth daily.    atorvastatin (LIPITOR) 40 mg tablet, TAKE 1 TABLET (40 MG TOTAL) BY MOUTH DAILY.    cholecalciferol, vitamin D3, 1,000 unit (25 mcg) tablet, Take 1,000 Units by mouth daily.    triamcinolone (KENALOG) 0.1 % ointment, APPLY TO THE AFFECTED AREA(S) TWICE DAILY FOR UP TO TWO WEEKS AS NEEDED FOR eczema    ubidecarenone (CO Q-10 ORAL), Take 1 tablet by mouth daily.    vitamins  A,C,E-zinc-copper 14,320-226-200 unit-mg-unit capsule, Take 2 capsules by mouth daily.                                                          OBJECTIVE   ROS as in HPI   Constitution: Negative for chills and fever.   Eyes: Negative for blurred vision and visual disturbance.   Cardiovascular: Negative for chest pain, dyspnea on exertion, near-syncope, palpitations and syncope.   Respiratory: Negative for hemoptysis, shortness of breath and wheezing.    Hematologic/Lymphatic: Negative for bleeding problem.   Skin: Negative for rash. No new skin changes  Gastrointestinal: Negative for abdominal pain, diarrhea, hematochezia, melena, nausea and vomiting.   Genitourinary: Negative for dysuria and hematuria.   Neurological: Negative for headaches.            Vitals:    24 0930   BP: 120/60   BP Location: Left upper arm   Patient Position: Sitting   Pulse: 73   Resp: 16   Weight: 101 kg (222 lb)   Height: 1.753 m (5' 9.02\")       BP Readings from Last 3 Encounters:   24 120/60   24 126/72   24 120/70     Wt Readings from Last 3 Encounters:   24 101 kg (222 lb)   24 101 kg (221 lb 12.8 oz)   24 101 kg (222 lb 6.4 oz)       Physical Exam   Constitutional: Appears comfortable in no distress  HEENT:  Neck Supple.  No JVD No carotid bruits no " "cervical lymphadenopathy  Head: Normocephalic.   Eyes: Extraocular movements intact  Cardiovascular: Normal rate, regular rhythm no S3 gallop Exam reveals no friction rub.    Pulmonary/Chest: Effort normal and breath sounds normal. No wheezes.  Abdominal: Soft and nontender. Bowel sounds are normal.   Musculoskeletal: No lower extremity edema.  Lipoma right forearm  Neurological: Alert and oriented to person, place, and time.   Skin: Skin is warm and dry.   Psychiatric: Behavior is normal.            Objective   No results found for: \"CHOL\"  No results found for: \"HDL\"  No results found for: \"LDLCALC\"  No results found for: \"TRIG\"  No results found for: \"ALT\", \"AST\"  No results found for: \"WBC\", \"HGB\", \"HCT\", \"PLT\", \"INR\"  Lab Results   Component Value Date    BUN 10 02/29/2024    CREATININE 0.70 (L) 02/29/2024     No results found for: \"HGBA1C\"  No results found for: \"TSH\"  No results found for: \"BNP\"  [unfilled]    Troponin I Results    No lab values to display.       No results found for: \"HSTROPONINI\"                                                       IMAGING     CT angiogram 7-  Heart and pericardium: Normal in size without effusion. Right left coronary  artery calcifications.  Vessels: Patent and normal.  Aortic annulus: 25 mm.  Sinus of Valsalva: 41 mm.  Sinotubular junction: 34 mm.  Ascending aorta at level of main pulmonary artery: 41 mm.  Proximal descending, at the main pulmonary artery: 28 mm.  Diaphragmatic hiatus: 27 mm.     Amy and mediastinum: Normal without lymphadenopathy or mass.     Tracheobronchial tree: Patent.  Lungs:  Right: 4 mm right middle lobe nodule (series A3, image 62).  Left: No nodules or air space disease.  Both: Otherwise clear bilaterally.  Pleura: Normal without fluid or thickening.     Lower neck: Normal.  Chest wall and axilla : Normal.  Bones: Thoracic spondylosis without acute skeletal abnormality.     The fully visualized liver, gallbladder, stomach, spleen, and " bilateral adrenal  glands are normal. The portions of the incompletely visualized pancreas,  kidneys, and small and large intestine are normal.    TRANSTHORACIC ECHO (TTE) COMPLETE 01/19/2023    Interpretation Summary    Left Ventricle: Normal ventricle size. Normal wall thickness. Muscle mass is normal. Estimated EF 60%. No regional wall motion abnormalities. Grade I diastolic dysfunction.    Right Ventricle: Normal ventricle size. Normal wall thickness.    Left Atrium: Mildly dilated atrium.    Right Atrium: Mildly dilated atrium.    Aortic Valve: Tricuspid valve.  Sclerotic leaflets.    Mitral Valve: Mitral annular calcification. Mild regurgitation.    Tricuspid Valve: Structure is grossly normal. Trace regurgitation.    Pulmonic Valve: Valve not well visualized.    Aorta: Aortic root calcified. Sinuses of Valsalva calcified. Ascending aorta calcified. Aortic arch grossly normal.    IVC/SVC: Inferior vena cava is <2.1cm. Inferior vena cava collapses >50% during inspiration.    Pericardium: Normal structure.    Left ventricle appears to be normal in dimension and motion the estimated ejection fraction 60% there is diastolic dysfunction type I.  The left atrium is mildly enlarged.  The mitral valve sclerotic.  There is mild central mitral regurgitation.  The right ventricle is normal in dimension and motion.  The right atrium is mildly enlarged.  There is a closure device seen in the intra-atrial septum.  The aortic valve has 3 cusps it is sclerotic but opens normally.  The aortic root is mildly dilated at 4.1 cm it is also calcified mildly so.  The inferior vena cava is normal in dimension and motion.        US CAROTID BILATERAL 12/03/2020    Interpretation Summary  · There is less than 50% stenosis in the left and right internal carotid arteries with at most mild heterogeneous plaque bilaterally.  · Antegrade vertebral flow bilaterally.      EKG  Normal sinus rhythm 73bpm [id number]  Inferior infarct (cited on or  before 23-DEC-2021)  Abnormal ECG  When compared with ECG of 23-DEC-2021 11:01,  T wave inversion no longer evident in Inferior leads                                            ASSESSMENT AND PLAN   Mr. De La Garza is an 82-year-old man with the following issues:    Assessment/Plan    Atherosclerosis of coronary artery  Mild coronary artery calcification and aortic atherosclerosis noted on CT scan 2020.  Continue with aspirin and statin.  Unremarkable stress EKG 2021.  Low threshold for additional testing if patient develops symptoms.    Aortic aneurysm (CMS/HCC)  History of thoracic aortic aneurysm 4.1 cm on CT scan 2021.  No significant change recently and patient saw cardiothoracic surgery 3-2024.  Will continue with surveillance clinic there with imaging as directed by surgical surveillance clinic.    Hyperlipidemia  Atorvastatin 40 mg daily.  Blood work 1-2024 shows LDL 67 triglycerides 61 HDL 51 normal liver function tests              Return in about 6 months (around 1/24/2025).         Thank you for allowing me to participate in the care of this patient.  I hope this information is helpful.         Duyen Gallego MD [facility]  7/24/2024  10:35 AM    This document was generated utilizing voice recognition technology. A reasonable attempt at proofreading has been made to minimize errors but please excuse any typographical errors which may be present. Please call with any questions.

## 2024-07-24 NOTE — LETTER
July 24, 2024     Armin Aaron Sr., DO  [address] [facility] [address]    Patient: Naif De La Garza  YOB: 1940  Date of Visit: 7/24/2024      Dear Dr. Aaron:    Thank you for referring Naif De La Garza to me for evaluation. Below are my notes for this consultation.    If you have questions, please do not hesitate to call me. I look forward to following your patient along with you.         Sincerely,        Duyen Gallego MD        CC: No Recipients    Duyen Gallego MD  7/24/2024 10:35 AM  Sign when Signing Visit       Cardiology Outpatient Note    [facility] Office  [address] [id number]     [facility]  The Heart [facility] Level  [address] [place] [address]     TEL  [phone]  [facility]/[facility]     Naif De La Garza is a 83 y.o. male patient with history of hyyperlipidemia.   Patient without history of  congestive heart failure, stroke, sleep apnea, myocardial infarction,  cardiac stenting, cardiac surgery, rheumatic heart disease, congenital heart disease, autoimmune disease, tuberculosis, cancer or cancer treatment, peripheral vascular disease, pulmonary embolus, hypertension or diabetes    No history of major surgeries or blood transfusions.  Previous smoker and stopped in 1985.  No clear family history of accelerated coronary artery disease.    Patient had treadmill exercise stress test 2021 and exercised on Andrew protocol for 4 minutes 7 METS-unremarkable study.    Patient is currently asymptomatic.  No recent hospitalizations illnesses or COVID infections.  Patient has been inactive because of plantar fasciitis .                                                           PMH     Medical History:  Past Medical History:   Diagnosis Date   • Aortic aneurysm (CMS/HCC)    • Coronary artery disease    • History of colon cancer    • Hyperlipidemia    • Lipid disorder        Surgical History:  Past Surgical  History:   Procedure Laterality Date   • BUNIONECTOMY     • CATARACT EXTRACTION Bilateral 2023   • COLON SURGERY         Social History:  Social History     Tobacco Use   • Smoking status: Former     Packs/day: 1.00     Years: 15.00     Additional pack years: 0.00     Total pack years: 15.00     Types: Cigarettes     Quit date:      Years since quittin.5   • Smokeless tobacco: Never   Vaping Use   • Vaping Use: Never used   Substance Use Topics   • Alcohol use: Yes     Comment: occasionally   • Drug use: No       Family History: He indicated that his biological mother is . He indicated that his biological father is .      Allergies:Quinolones    Current Medications:    Outpatient Encounter Medications as of 2024:   •  aspirin 81 mg enteric coated tablet, Take 81 mg by mouth daily.  •  atorvastatin (LIPITOR) 40 mg tablet, TAKE 1 TABLET (40 MG TOTAL) BY MOUTH DAILY.  •  cholecalciferol, vitamin D3, 1,000 unit (25 mcg) tablet, Take 1,000 Units by mouth daily.  •  triamcinolone (KENALOG) 0.1 % ointment, APPLY TO THE AFFECTED AREA(S) TWICE DAILY FOR UP TO TWO WEEKS AS NEEDED FOR eczema  •  ubidecarenone (CO Q-10 ORAL), Take 1 tablet by mouth daily.  •  vitamins  A,C,E-zinc-copper 14,320-226-200 unit-mg-unit capsule, Take 2 capsules by mouth daily.                                                          OBJECTIVE   ROS as in HPI   Constitution: Negative for chills and fever.   Eyes: Negative for blurred vision and visual disturbance.   Cardiovascular: Negative for chest pain, dyspnea on exertion, near-syncope, palpitations and syncope.   Respiratory: Negative for hemoptysis, shortness of breath and wheezing.    Hematologic/Lymphatic: Negative for bleeding problem.   Skin: Negative for rash. No new skin changes  Gastrointestinal: Negative for abdominal pain, diarrhea, hematochezia, melena, nausea and vomiting.   Genitourinary: Negative for dysuria and hematuria.   Neurological:  "Negative for headaches.            Vitals:    07/24/24 0930   BP: 120/60   BP Location: Left upper arm   Patient Position: Sitting   Pulse: 73   Resp: 16   Weight: 101 kg (222 lb)   Height: 1.753 m (5' 9.02\")       BP Readings from Last 3 Encounters:   07/24/24 120/60   03/08/24 126/72   01/24/24 120/70     Wt Readings from Last 3 Encounters:   07/24/24 101 kg (222 lb)   03/08/24 101 kg (221 lb 12.8 oz)   01/24/24 101 kg (222 lb 6.4 oz)       Physical Exam   Constitutional: Appears comfortable in no distress  HEENT:  Neck Supple.  No JVD No carotid bruits no cervical lymphadenopathy  Head: Normocephalic.   Eyes: Extraocular movements intact  Cardiovascular: Normal rate, regular rhythm no S3 gallop Exam reveals no friction rub.    Pulmonary/Chest: Effort normal and breath sounds normal. No wheezes.  Abdominal: Soft and nontender. Bowel sounds are normal.   Musculoskeletal: No lower extremity edema.  Lipoma right forearm  Neurological: Alert and oriented to person, place, and time.   Skin: Skin is warm and dry.   Psychiatric: Behavior is normal.            Objective   No results found for: \"CHOL\"  No results found for: \"HDL\"  No results found for: \"LDLCALC\"  No results found for: \"TRIG\"  No results found for: \"ALT\", \"AST\"  No results found for: \"WBC\", \"HGB\", \"HCT\", \"PLT\", \"INR\"  Lab Results   Component Value Date    BUN 10 02/29/2024    CREATININE 0.70 (L) 02/29/2024     No results found for: \"HGBA1C\"  No results found for: \"TSH\"  No results found for: \"BNP\"  [unfilled]    Troponin I Results    No lab values to display.       No results found for: \"HSTROPONINI\"                                                       IMAGING     CT angiogram 7-  Heart and pericardium: Normal in size without effusion. Right left coronary  artery calcifications.  Vessels: Patent and normal.  Aortic annulus: 25 mm.  Sinus of Valsalva: 41 mm.  Sinotubular junction: 34 mm.  Ascending aorta at level of main pulmonary artery: 41 " mm.  Proximal descending, at the main pulmonary artery: 28 mm.  Diaphragmatic hiatus: 27 mm.     Amy and mediastinum: Normal without lymphadenopathy or mass.     Tracheobronchial tree: Patent.  Lungs:  Right: 4 mm right middle lobe nodule (series A3, image 62).  Left: No nodules or air space disease.  Both: Otherwise clear bilaterally.  Pleura: Normal without fluid or thickening.     Lower neck: Normal.  Chest wall and axilla : Normal.  Bones: Thoracic spondylosis without acute skeletal abnormality.     The fully visualized liver, gallbladder, stomach, spleen, and bilateral adrenal  glands are normal. The portions of the incompletely visualized pancreas,  kidneys, and small and large intestine are normal.    TRANSTHORACIC ECHO (TTE) COMPLETE 01/19/2023    Interpretation Summary  •  Left Ventricle: Normal ventricle size. Normal wall thickness. Muscle mass is normal. Estimated EF 60%. No regional wall motion abnormalities. Grade I diastolic dysfunction.  •  Right Ventricle: Normal ventricle size. Normal wall thickness.  •  Left Atrium: Mildly dilated atrium.  •  Right Atrium: Mildly dilated atrium.  •  Aortic Valve: Tricuspid valve.  Sclerotic leaflets.  •  Mitral Valve: Mitral annular calcification. Mild regurgitation.  •  Tricuspid Valve: Structure is grossly normal. Trace regurgitation.  •  Pulmonic Valve: Valve not well visualized.  •  Aorta: Aortic root calcified. Sinuses of Valsalva calcified. Ascending aorta calcified. Aortic arch grossly normal.  •  IVC/SVC: Inferior vena cava is <2.1cm. Inferior vena cava collapses >50% during inspiration.  •  Pericardium: Normal structure.    Left ventricle appears to be normal in dimension and motion the estimated ejection fraction 60% there is diastolic dysfunction type I.  The left atrium is mildly enlarged.  The mitral valve sclerotic.  There is mild central mitral regurgitation.  The right ventricle is normal in dimension and motion.  The right atrium is mildly  enlarged.  There is a closure device seen in the intra-atrial septum.  The aortic valve has 3 cusps it is sclerotic but opens normally.  The aortic root is mildly dilated at 4.1 cm it is also calcified mildly so.  The inferior vena cava is normal in dimension and motion.        US CAROTID BILATERAL 12/03/2020    Interpretation Summary  · There is less than 50% stenosis in the left and right internal carotid arteries with at most mild heterogeneous plaque bilaterally.  · Antegrade vertebral flow bilaterally.      EKG  Normal sinus rhythm 73bpm [id number]  Inferior infarct (cited on or before 23-DEC-2021)  Abnormal ECG  When compared with ECG of 23-DEC-2021 11:01,  T wave inversion no longer evident in Inferior leads                                            ASSESSMENT AND PLAN   Mr. De La Garza is an 82-year-old man with the following issues:    Assessment/Plan    Atherosclerosis of coronary artery  Mild coronary artery calcification and aortic atherosclerosis noted on CT scan 2020.  Continue with aspirin and statin.  Unremarkable stress EKG 2021.  Low threshold for additional testing if patient develops symptoms.    Aortic aneurysm (CMS/HCC)  History of thoracic aortic aneurysm 4.1 cm on CT scan 2021.  No significant change recently and patient saw cardiothoracic surgery 3-2024.  Will continue with surveillance clinic there with imaging as directed by surgical surveillance clinic.    Hyperlipidemia  Atorvastatin 40 mg daily.  Blood work 1-2024 shows LDL 67 triglycerides 61 HDL 51 normal liver function tests              Return in about 6 months (around 1/24/2025).         Thank you for allowing me to participate in the care of this patient.  I hope this information is helpful.         Duyen Gallego MD [facility]  7/24/2024  10:35 AM    This document was generated utilizing voice recognition technology. A reasonable attempt at proofreading has been made to minimize errors but please excuse any typographical errors which may  be present. Please call with any questions.

## 2024-07-24 NOTE — ASSESSMENT & PLAN NOTE
History of thoracic aortic aneurysm 4.1 cm on CT scan 2021.  No significant change recently and patient saw cardiothoracic surgery 3-2024.  Will continue with surveillance clinic there with imaging as directed by surgical surveillance clinic.

## 2024-07-24 NOTE — ASSESSMENT & PLAN NOTE
Mild coronary artery calcification and aortic atherosclerosis noted on CT scan 2024.  Continue with aspirin and statin.  Unremarkable stress EKG 2021.  Low threshold for additional testing if patient develops symptoms.

## 2024-07-24 NOTE — ASSESSMENT & PLAN NOTE
Atorvastatin 40 mg daily.  Blood work 1-2024 shows LDL 67 triglycerides 61 HDL 51 normal liver function tests

## 2024-08-06 ENCOUNTER — OFFICE VISIT (OUTPATIENT)
Dept: FAMILY MEDICINE | Facility: CLINIC | Age: 84
End: 2024-08-06
Payer: MEDICARE

## 2024-08-06 VITALS
BODY MASS INDEX: 32.44 KG/M2 | SYSTOLIC BLOOD PRESSURE: 120 MMHG | OXYGEN SATURATION: 98 % | HEIGHT: 69 IN | HEART RATE: 77 BPM | TEMPERATURE: 97.3 F | WEIGHT: 219 LBS | DIASTOLIC BLOOD PRESSURE: 74 MMHG

## 2024-08-06 DIAGNOSIS — I25.10 ATHEROSCLEROSIS OF NATIVE CORONARY ARTERY OF NATIVE HEART WITHOUT ANGINA PECTORIS: ICD-10-CM

## 2024-08-06 DIAGNOSIS — E78.00 PURE HYPERCHOLESTEROLEMIA: ICD-10-CM

## 2024-08-06 DIAGNOSIS — S39.011A ABDOMINAL WALL STRAIN, INITIAL ENCOUNTER: Primary | ICD-10-CM

## 2024-08-06 DIAGNOSIS — I71.20 THORACIC AORTIC ANEURYSM WITHOUT RUPTURE, UNSPECIFIED PART (CMS/HCC): ICD-10-CM

## 2024-08-06 DIAGNOSIS — Z85.038 HISTORY OF COLON CANCER: ICD-10-CM

## 2024-08-06 PROBLEM — F17.201 TOBACCO ABUSE, IN REMISSION: Status: RESOLVED | Noted: 2018-08-16 | Resolved: 2024-08-06

## 2024-08-06 PROCEDURE — 99204 OFFICE O/P NEW MOD 45 MIN: CPT | Performed by: FAMILY MEDICINE

## 2024-08-06 ASSESSMENT — ENCOUNTER SYMPTOMS
FEVER: 0
DIARRHEA: 0
NAUSEA: 0
PALPITATIONS: 0
ARTHRALGIAS: 1
SHORTNESS OF BREATH: 0
DYSURIA: 0
COUGH: 0
CHILLS: 0
HEMATURIA: 0
ABDOMINAL PAIN: 1
RHINORRHEA: 0
BLOOD IN STOOL: 0
VOMITING: 0

## 2024-08-06 ASSESSMENT — PATIENT HEALTH QUESTIONNAIRE - PHQ9: SUM OF ALL RESPONSES TO PHQ9 QUESTIONS 1 & 2: 0

## 2024-08-06 NOTE — PROGRESS NOTES
Subjective    Naif De La Garza is a 83 y.o. male presenting today for: NPV      HPI: 82yo male with h/o HL, +CAC, thoracic aortic aneurysm, h/o colon ca 2002 here to establish care.    Former patient of Dr. Aaron.    Only concern today is left side strain that bothers him when twisting to get out of bed. No injury. Walks regularly for exercise and no limitation.    HL/+CAC: tolerates statin fine. Takes regularly. Denies exertional CP/palpitations. Does have more fatigue going up stairs than previously, but had stress 2.5 yrs ago that was negative for ischemia. Follows with cardiology.    Exercise: Walking - shoots for daily. A little limited by plantar fasciitis.    H/o colon ca s/p resection in 2002. Following with GI. Unsure if/when due for colonoscopy.       Health Maintenance Due   Topic Date Due    Advance Care Planning  Never done    Medicare Annual Wellness Visit  Never done    RSV (60+ years old [shared decision making] or in pregnancy during 32 through 36 weeks) (1 - 1-dose 60+ series) Never done    Falls Risk Screening  Never done    COVID-19 Vaccine (7 - 2023-24 season) 01/21/2024    Influenza Vaccine (1) 08/01/2024         Patient Active Problem List   Diagnosis    Atherosclerosis of coronary artery    Hyperlipidemia    Aortic aneurysm (CMS/HCC)    Abdominal wall strain, initial encounter    History of colon cancer          Current Outpatient Medications:     aspirin 81 mg enteric coated tablet, Take 81 mg by mouth daily., Disp: , Rfl:     atorvastatin (LIPITOR) 40 mg tablet, TAKE 1 TABLET (40 MG TOTAL) BY MOUTH DAILY., Disp: 90 tablet, Rfl: 3    cholecalciferol, vitamin D3, 1,000 unit (25 mcg) tablet, Take 1,000 Units by mouth daily., Disp: , Rfl:     triamcinolone (KENALOG) 0.1 % ointment, APPLY TO THE AFFECTED AREA(S) TWICE DAILY FOR UP TO TWO WEEKS AS NEEDED FOR eczema, Disp: , Rfl: 3    ubidecarenone (CO Q-10 ORAL), Take 1 tablet by mouth daily., Disp: , Rfl:     vitamins  A,C,E-zinc-copper  "14,[id number] unit-mg-unit capsule, Take 2 capsules by mouth daily., Disp: , Rfl:      Allergies   Allergen Reactions    Quinolones      H/O aneurysm            Review of Systems   Constitutional:  Negative for chills and fever.   HENT:  Negative for congestion and rhinorrhea.    Respiratory:  Negative for cough and shortness of breath.    Cardiovascular:  Negative for chest pain and palpitations.   Gastrointestinal:  Positive for abdominal pain (abd wall pain). Negative for blood in stool, diarrhea, nausea and vomiting.   Genitourinary:  Negative for dysuria and hematuria.   Musculoskeletal:  Positive for arthralgias (right plantar fasciitis - improving with exercises).        Objective  Visit Vitals  /74 (BP Location: Left upper arm, Patient Position: Sitting)   Pulse 77   Temp 36.3 °C (97.3 °F) (Temporal)   Ht 1.753 m (5' 9.02\")   Wt 99.3 kg (219 lb)   SpO2 98%   BMI 32.32 kg/m²    Body mass index is 32.32 kg/m².    Wt Readings from Last 3 Encounters:   08/06/24 99.3 kg (219 lb)   07/24/24 101 kg (222 lb)   03/08/24 101 kg (221 lb 12.8 oz)       BP Readings from Last 3 Encounters:   08/06/24 120/74   07/24/24 120/60   03/08/24 126/72            Physical Exam  Vitals reviewed.   Constitutional:       General: He is not in acute distress.     Appearance: Normal appearance. He is obese. He is not ill-appearing.   HENT:      Head: Normocephalic and atraumatic.      Right Ear: Tympanic membrane and ear canal normal.      Left Ear: Tympanic membrane and ear canal normal.      Nose: Nose normal. No congestion or rhinorrhea.      Mouth/Throat:      Mouth: Mucous membranes are moist.      Pharynx: Oropharynx is clear. No oropharyngeal exudate.   Eyes:      General:         Right eye: No discharge.         Left eye: No discharge.      Conjunctiva/sclera: Conjunctivae normal.      Pupils: Pupils are equal, round, and reactive to light.   Cardiovascular:      Rate and Rhythm: Normal rate and regular rhythm.      Heart " sounds: No murmur heard.  Pulmonary:      Effort: Pulmonary effort is normal.      Breath sounds: Normal breath sounds. No wheezing, rhonchi or rales.   Abdominal:      General: Bowel sounds are normal. There is no distension.      Palpations: Abdomen is soft.      Tenderness: There is no abdominal tenderness.   Musculoskeletal:         General: Tenderness (left lateral rib cage, mid axillary line) present. No swelling or deformity.      Cervical back: Normal range of motion and neck supple.   Lymphadenopathy:      Cervical: No cervical adenopathy.   Skin:     General: Skin is warm and dry.   Neurological:      General: No focal deficit present.      Mental Status: He is alert and oriented to person, place, and time.   Psychiatric:         Mood and Affect: Mood normal.         Behavior: Behavior normal.              Laboratories         Latest Reference Range & Units 02/29/24 09:20   BUN 8 - 27 mg/dL 10   Creatinine 0.76 - 1.27 mg/dL 0.70 (L)   eGFR >59 mL/min/1.73 93   (L): Data is abnormally low    Imaging/Studies         Assessment/Plan  Diagnoses and all orders for this visit:    Abdominal wall strain, initial encounter (Primary)  Assessment & Plan:  Anticipate gradual improvement with time.  Also provided home exercises.  F/u if not improving.      Atherosclerosis of native coronary artery of native heart without angina pectoris  Assessment & Plan:  Aspirin, statin.  Routine cardiology f/u.  Continue walking program and healthy diet.      Pure hypercholesterolemia  Assessment & Plan:  LDL at goal.  Continue current meds.      Thoracic aortic aneurysm without rupture, unspecified part (CMS/[facility])  Assessment & Plan:  Continue monitoring per cardiology/CT surgery.      History of colon cancer  Assessment & Plan:  Asked him to reach out to GI to see when he is due for colonoscopy.  He is 83 but very stable and life expectancy likely > 10 years, so reasonable to continue monitoring.        Labs at next  visit.  Recommended flu, COVID, RSV vaccines. He will consider and get after Labor Day.        Return in about 6 months (around 2/6/2025).     Yoly Payne MD

## 2024-08-06 NOTE — ASSESSMENT & PLAN NOTE
Asked him to reach out to GI to see when he is due for colonoscopy.  He is 83 but very stable and life expectancy likely > 10 years, so reasonable to continue monitoring.

## 2024-12-12 ENCOUNTER — OFFICE VISIT (OUTPATIENT)
Dept: CARDIOLOGY | Facility: CLINIC | Age: 84
End: 2024-12-12
Payer: MEDICARE

## 2024-12-12 ENCOUNTER — TELEPHONE (OUTPATIENT)
Dept: CARDIOTHORACIC SURGERY | Facility: CLINIC | Age: 84
End: 2024-12-12
Payer: MEDICARE

## 2024-12-12 VITALS
DIASTOLIC BLOOD PRESSURE: 78 MMHG | OXYGEN SATURATION: 95 % | HEART RATE: 83 BPM | BODY MASS INDEX: 31.65 KG/M2 | WEIGHT: 221.1 LBS | HEIGHT: 70 IN | SYSTOLIC BLOOD PRESSURE: 120 MMHG

## 2024-12-12 DIAGNOSIS — I35.1 NONRHEUMATIC AORTIC VALVE INSUFFICIENCY: Primary | ICD-10-CM

## 2024-12-12 DIAGNOSIS — I71.21 ANEURYSM OF ASCENDING AORTA WITHOUT RUPTURE (CMS/HCC): Primary | ICD-10-CM

## 2024-12-12 DIAGNOSIS — I25.10 ATHEROSCLEROSIS OF NATIVE CORONARY ARTERY OF NATIVE HEART WITHOUT ANGINA PECTORIS: ICD-10-CM

## 2024-12-12 DIAGNOSIS — I11.9 HYPERTENSIVE HEART DISEASE WITHOUT HEART FAILURE: ICD-10-CM

## 2024-12-12 LAB
ATRIAL RATE: 83
P AXIS: 47
PR INTERVAL: 192
QRS DURATION: 92
QT INTERVAL: 364
QTC CALCULATION(BAZETT): 427
R AXIS: 24
T WAVE AXIS: -7
VENTRICULAR RATE: 83

## 2024-12-12 PROCEDURE — 99215 OFFICE O/P EST HI 40 MIN: CPT | Performed by: INTERNAL MEDICINE

## 2024-12-12 PROCEDURE — G2211 COMPLEX E/M VISIT ADD ON: HCPCS | Performed by: INTERNAL MEDICINE

## 2024-12-12 PROCEDURE — 93000 ELECTROCARDIOGRAM COMPLETE: CPT | Performed by: INTERNAL MEDICINE

## 2024-12-12 NOTE — TELEPHONE ENCOUNTER
Pt presented in office to schedule aortic f/u. Per notes from 3/2024 OV, pt is to return in 18 months w/ gated CTA Chest. [place] [facility] [place] aware he will be contacted later today for CTA. Reminder printed and handed to pt.

## 2024-12-12 NOTE — ASSESSMENT & PLAN NOTE
Coronary artery calcification noted is moderate on CT scan 2024.  Continue with aspirin and statin.  Unremarkable stress EKG 2021.  Low threshold for additional testing if patient develops symptoms.  May also consider cardiac PET CT scan.

## 2024-12-12 NOTE — TELEPHONE ENCOUNTER
Spoke to pt and confirmed CTA Chest for 9/8/25 @ 9am (8:30am arrival) @ [facility] [place], [place] to proceed aortic OV visit. Pt to have labs drawn within 6 weeks of scan @ [facility] per his request. Reminders and labs mailed.

## 2024-12-12 NOTE — ASSESSMENT & PLAN NOTE
History of thoracic aortic aneurysm 4.1 cm on CT scan 2021.  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  There was no significant change.  Patient has been asked to make follow-up with them as he has not seen them formally.  He plans to do so in the next 6 months.  Has stable right pulmonary nodule and has been asked to follow-up with his primary care physician regarding this.  He has an upcoming appointment early next year.

## 2024-12-12 NOTE — LETTER
December 12, 2024     Yoly Payne MD  [address] [address]    Patient: Naif De La Garza  YOB: 1940  Date of Visit: 12/12/2024      Dear Dr. Payne:    Thank you for referring Naif De La Garza to me for evaluation. Below are my notes for this consultation.    If you have questions, please do not hesitate to call me. I look forward to following your patient along with you.         Sincerely,        Duyen Gallego MD        CC: No Recipients    Duyen Gallego MD  12/12/2024 11:40 AM  Sign when Signing Visit       Cardiology Outpatient Note    [facility]  [address] [place] [address]     [facility]  [address] [place] [address]     TEL  [phone]  [facility]     Naif De La Garza is a 84 y.o. male patient with history of hyperlipidemia and coronary artery calcifications.  He was noted to have mild calcifications on chest CT 2021.  Had unremarkable treadmill stress test at that time when he was seeing Dr. Barrow.  In 2017 he had a calcium CT scan with a calcium score of 794.  CT scan done in 2024 reported moderate coronary calcifications.  Patient without history of  congestive heart failure, stroke,  myocardial infarction,  cardiac stenting, cardiac surgery, rheumatic heart disease, congenital heart disease, autoimmune disease, tuberculosis, cancer or cancer treatment, peripheral vascular disease, pulmonary embolus, hypertension or diabetes    No history of major surgeries or blood transfusions.  Previous smoker and stopped in 1985.  No clear family history of accelerated coronary artery disease.    Overall patient feels well though he feels that he has slowed down and just has been doing less.  He feels slightly more winded when doing things now.  No chest pain no palpitations no syncope.  No recent hospitalizations infections or  surgeries.  Patient is currently asymptomatic.                                                             [facility]     Medical History:  Past Medical History:   Diagnosis Date   • Aortic aneurysm (CMS/HCC)    • Coronary artery disease    • History of colon cancer    • Hyperlipidemia    • Lipid disorder        Surgical History:  Past Surgical History   Procedure Laterality Date   • Bunionectomy Left    • Cataract extraction Bilateral 2023   • Colon surgery  2002    colon cancer resection       Social History:  Social History     Tobacco Use   • Smoking status: Former     Current packs/day: 0.00     Average packs/day: 1 pack/day for 15.0 years (15.0 ttl pk-yrs)     Types: Cigarettes     Start date:      Quit date:      Years since quittin.9   • Smokeless tobacco: Never   • Tobacco comments:     Worked for [employer] for 35 years. Did not wear masks.    Vaping Use   • Vaping status: Never Used   Substance Use Topics   • Alcohol use: Yes     Comment: occasionally   • Drug use: No       Family History: He indicated that his biological mother is . He indicated that his biological father is . He indicated that the status of his biological brother is unknown.      Allergies:Quinolones    Current Medications:    Outpatient Encounter Medications as of 2024:   •  aspirin 81 mg enteric coated tablet, Take 81 mg by mouth daily.  •  atorvastatin (LIPITOR) 40 mg tablet, TAKE 1 TABLET (40 MG TOTAL) BY MOUTH DAILY.  •  cholecalciferol, vitamin D3, 1,000 unit (25 mcg) tablet, Take 1,000 Units by mouth daily.  •  triamcinolone (KENALOG) 0.1 % ointment, APPLY TO THE AFFECTED AREA(S) TWICE DAILY FOR UP TO TWO WEEKS AS NEEDED FOR eczema  •  ubidecarenone (CO Q-10 ORAL), Take 1 tablet by mouth daily.  •  vitamins  A,C,E-zinc-copper 14,320-226-200 unit-mg-unit capsule, Take 2 capsules by mouth daily.                                                          OBJECTIVE   ROS as in HPI  "  Constitution: Negative for chills and fever.   Eyes: Negative for blurred vision and visual disturbance.   Cardiovascular: Negative for chest pain, near-syncope, palpitations and syncope.   Respiratory: Negative for hemoptysis, as noted in HPI  Hematologic/Lymphatic: Negative for bleeding problem.   Skin: Negative for rash. No new skin changes  Gastrointestinal: Negative for abdominal pain, diarrhea, hematochezia, melena, nausea and vomiting.   Genitourinary: Negative for dysuria and hematuria.   Neurological: Negative for headaches.            Vitals:    12/12/24 0917   BP: 120/78   Pulse: 83   SpO2: 95%   Weight: 100 kg (221 lb 1.6 oz)   Height: 1.765 m (5' 9.5\")       BP Readings from Last 3 Encounters:   12/12/24 120/78   08/06/24 120/74   07/24/24 120/60     Wt Readings from Last 3 Encounters:   12/12/24 100 kg (221 lb 1.6 oz)   08/06/24 99.3 kg (219 lb)   07/24/24 101 kg (222 lb)       Physical Exam   Constitutional: Appears comfortable in no distress  HEENT:  Neck Supple.  No JVD No carotid bruits no cervical lymphadenopathy  Head: Normocephalic.   Eyes: Extraocular movements intact  Cardiovascular: Normal rate, regular rhythm no S3 gallop Exam reveals no friction rub.    Pulmonary/Chest: Effort normal and breath sounds normal. No wheezes.  Abdominal: Soft and nontender. Bowel sounds are normal.   Musculoskeletal: No lower extremity edema.  Lipoma right forearm  Neurological: Alert and oriented to person, place, and time.   Skin: Skin is warm and dry.   Psychiatric: Behavior is normal.            Objective   No results found for: \"CHOL\"  No results found for: \"HDL\"  No results found for: \"LDLCALC\"  No results found for: \"TRIG\"  No results found for: \"ALT\", \"AST\"  No results found for: \"WBC\", \"HGB\", \"HCT\", \"PLT\", \"INR\"  Lab Results   Component Value Date    BUN 10 02/29/2024    CREATININE 0.70 (L) 02/29/2024     No results found for: \"HGBA1C\"  No results found for: \"TSH\"  No results found for: " "\"BNP\"  [unfilled]    Troponin I Results    No lab values to display.       No results found for: \"HSTROPONINI\"                                                       IMAGING     CT angiogram 7-  Heart and pericardium: Normal in size without effusion. Right left coronary  artery calcifications.  Vessels: Patent and normal.  Aortic annulus: 25 mm.  Sinus of Valsalva: 41 mm.  Sinotubular junction: 34 mm.  Ascending aorta at level of main pulmonary artery: 41 mm.  Proximal descending, at the main pulmonary artery: 28 mm.  Diaphragmatic hiatus: 27 mm.     Amy and mediastinum: Normal without lymphadenopathy or mass.     Tracheobronchial tree: Patent.  Lungs:  Right: 4 mm right middle lobe nodule (series A3, image 62).  Left: No nodules or air space disease.  Both: Otherwise clear bilaterally.  Pleura: Normal without fluid or thickening.     Lower neck: Normal.  Chest wall and axilla : Normal.  Bones: Thoracic spondylosis without acute skeletal abnormality.     The fully visualized liver, gallbladder, stomach, spleen, and bilateral adrenal  glands are normal. The portions of the incompletely visualized pancreas,  kidneys, and small and large intestine are normal.    TRANSTHORACIC ECHO (TTE) COMPLETE 01/19/2023    Interpretation Summary  •  Left Ventricle: Normal ventricle size. Normal wall thickness. Muscle mass is normal. Estimated EF 60%. No regional wall motion abnormalities. Grade I diastolic dysfunction.  •  Right Ventricle: Normal ventricle size. Normal wall thickness.  •  Left Atrium: Mildly dilated atrium.  •  Right Atrium: Mildly dilated atrium.  •  Aortic Valve: Tricuspid valve.  Sclerotic leaflets.  •  Mitral Valve: Mitral annular calcification. Mild regurgitation.  •  Tricuspid Valve: Structure is grossly normal. Trace regurgitation.  •  Pulmonic Valve: Valve not well visualized.  •  Aorta: Aortic root calcified. Sinuses of Valsalva calcified. Ascending aorta calcified. Aortic arch grossly normal.  •  " IVC/SVC: Inferior vena cava is <2.1cm. Inferior vena cava collapses >50% during inspiration.  •  Pericardium: Normal structure.    Left ventricle appears to be normal in dimension and motion the estimated ejection fraction 60% there is diastolic dysfunction type I.  The left atrium is mildly enlarged.  The mitral valve sclerotic.  There is mild central mitral regurgitation.  The right ventricle is normal in dimension and motion.  The right atrium is mildly enlarged.  There is a closure device seen in the intra-atrial septum.  The aortic valve has 3 cusps it is sclerotic but opens normally.  The aortic root is mildly dilated at 4.1 cm it is also calcified mildly so.  The inferior vena cava is normal in dimension and motion.        US CAROTID BILATERAL 12/03/2020    Interpretation Summary  · There is less than 50% stenosis in the left and right internal carotid arteries with at most mild heterogeneous plaque bilaterally.  · Antegrade vertebral flow bilaterally.      EKG  Normal sinus rhythm 83bpm [id number]  Minimal voltage criteria for LVH, may be normal variant if age is < 37 yrs ( R in aVL )  Inferior infarct (cited on or before 23-DEC-2021)  When compared with ECG of 24-JUL-2024 09:33,  No significant change was found                                               ASSESSMENT AND PLAN   Mr. De La Garza is an 84-year-old man with the following issues:    Assessment/Plan    Aortic aneurysm (CMS/[facility])  History of thoracic aortic aneurysm 4.1 cm on CT scan 2021.  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  There was no significant change.  Patient has been asked to make follow-up with them as he has not seen them formally.  He plans to do so in the next 6 months.  Has stable right pulmonary nodule and has been asked to follow-up with his primary care physician regarding this.  He has an upcoming appointment early next year.      Atherosclerosis  of coronary artery  Coronary artery calcification noted is moderate on CT scan 2024.  Continue with aspirin and statin.  Unremarkable stress EKG 2021.  Low threshold for additional testing if patient develops symptoms.  May also consider cardiac PET CT scan.     Hyperlipidemia  Atorvastatin 40 mg daily.  Blood work 1-2024 shows LDL 67 triglycerides 61 HDL 51 normal liver function tests      Patient's echocardiogram 2023 read as closure device in interatrial septum although this is not mentioned in prior cardiology notes nor does patient recall history of this.  Attempted to review echocardiogram but images are archived.  Will consider repeating echocardiogram.          Return in about 6 months (around 6/12/2025).         Thank you for allowing me to participate in the care of this patient.  I hope this information is helpful.         Duyen Gallego MD [facility]  12/12/2024  11:35 AM    This document was generated utilizing voice recognition technology. A reasonable attempt at proofreading has been made to minimize errors but please excuse any typographical errors which may be present. Please call with any questions.

## 2024-12-12 NOTE — PROGRESS NOTES
Cardiology Outpatient Note    [facility] Office  [address] [place] [address]       [facility] [address] [address]  [address] [place] [address]     TEL  [phone]  [facility]     Naif De La Garza is a 84 y.o. male patient with history of hyperlipidemia and coronary artery calcifications.  He was noted to have mild calcifications on chest CT 2021.  Had unremarkable treadmill stress test at that time when he was seeing Dr. Barrow.  In 2017 he had a calcium CT scan with a calcium score of 794.  CT scan done in 2024 reported moderate coronary calcifications.  Patient without history of  congestive heart failure, stroke,  myocardial infarction,  cardiac stenting, cardiac surgery, rheumatic heart disease, congenital heart disease, autoimmune disease, tuberculosis, cancer or cancer treatment, peripheral vascular disease, pulmonary embolus, hypertension or diabetes    No history of major surgeries or blood transfusions.  Previous smoker and stopped in 1985.  No clear family history of accelerated coronary artery disease.    Overall patient feels well though he feels that he has slowed down and just has been doing less.  He feels slightly more winded when doing things now.  No chest pain no palpitations no syncope.  No recent hospitalizations infections or surgeries.  Patient is currently asymptomatic.                                                             PMH     Medical History:  Past Medical History:   Diagnosis Date    Aortic aneurysm (CMS/HCC)     Coronary artery disease     History of colon cancer     Hyperlipidemia     Lipid disorder        Surgical History:  Past Surgical History   Procedure Laterality Date    Bunionectomy Left 2009    Cataract extraction Bilateral 07/2023    Colon surgery  2002    colon cancer resection       Social History:  Social History     Tobacco Use    Smoking status: Former     Current  packs/day: 0.00     Average packs/day: 1 pack/day for 15.0 years (15.0 ttl pk-yrs)     Types: Cigarettes     Start date:      Quit date:      Years since quittin.9    Smokeless tobacco: Never    Tobacco comments:     Worked for [employer] for 35 years. Did not wear masks.    Vaping Use    Vaping status: Never Used   Substance Use Topics    Alcohol use: Yes     Comment: occasionally    Drug use: No       Family History: He indicated that his biological mother is . He indicated that his biological father is . He indicated that the status of his biological brother is unknown.      Allergies:Quinolones    Current Medications:    Outpatient Encounter Medications as of 2024:     aspirin 81 mg enteric coated tablet, Take 81 mg by mouth daily.    atorvastatin (LIPITOR) 40 mg tablet, TAKE 1 TABLET (40 MG TOTAL) BY MOUTH DAILY.    cholecalciferol, vitamin D3, 1,000 unit (25 mcg) tablet, Take 1,000 Units by mouth daily.    triamcinolone (KENALOG) 0.1 % ointment, APPLY TO THE AFFECTED AREA(S) TWICE DAILY FOR UP TO TWO WEEKS AS NEEDED FOR eczema    ubidecarenone (CO Q-10 ORAL), Take 1 tablet by mouth daily.    vitamins  A,C,E-zinc-copper [id number] unit-mg-unit capsule, Take 2 capsules by mouth daily.                                                          OBJECTIVE   ROS as in HPI   Constitution: Negative for chills and fever.   Eyes: Negative for blurred vision and visual disturbance.   Cardiovascular: Negative for chest pain, near-syncope, palpitations and syncope.   Respiratory: Negative for hemoptysis, as noted in HPI  Hematologic/Lymphatic: Negative for bleeding problem.   Skin: Negative for rash. No new skin changes  Gastrointestinal: Negative for abdominal pain, diarrhea, hematochezia, melena, nausea and vomiting.   Genitourinary: Negative for dysuria and hematuria.   Neurological: Negative for headaches.            Vitals:    24 0917   BP: 120/78   Pulse: 83   SpO2:  "95%   Weight: 100 kg (221 lb 1.6 oz)   Height: 1.765 m (5' 9.5\")       BP Readings from Last 3 Encounters:   12/12/24 120/78   08/06/24 120/74   07/24/24 120/60     Wt Readings from Last 3 Encounters:   12/12/24 100 kg (221 lb 1.6 oz)   08/06/24 99.3 kg (219 lb)   07/24/24 101 kg (222 lb)       Physical Exam   Constitutional: Appears comfortable in no distress  HEENT:  Neck Supple.  No JVD No carotid bruits no cervical lymphadenopathy  Head: Normocephalic.   Eyes: Extraocular movements intact  Cardiovascular: Normal rate, regular rhythm no S3 gallop Exam reveals no friction rub.    Pulmonary/Chest: Effort normal and breath sounds normal. No wheezes.  Abdominal: Soft and nontender. Bowel sounds are normal.   Musculoskeletal: No lower extremity edema.  Lipoma right forearm  Neurological: Alert and oriented to person, place, and time.   Skin: Skin is warm and dry.   Psychiatric: Behavior is normal.            Objective   No results found for: \"CHOL\"  No results found for: \"HDL\"  No results found for: \"LDLCALC\"  No results found for: \"TRIG\"  No results found for: \"ALT\", \"AST\"  No results found for: \"WBC\", \"HGB\", \"HCT\", \"PLT\", \"INR\"  Lab Results   Component Value Date    BUN 10 02/29/2024    CREATININE 0.70 (L) 02/29/2024     No results found for: \"HGBA1C\"  No results found for: \"TSH\"  No results found for: \"BNP\"  [unfilled]    Troponin I Results    No lab values to display.       No results found for: \"HSTROPONINI\"                                                       IMAGING     CT angiogram 7-  Heart and pericardium: Normal in size without effusion. Right left coronary  artery calcifications.  Vessels: Patent and normal.  Aortic annulus: 25 mm.  Sinus of Valsalva: 41 mm.  Sinotubular junction: 34 mm.  Ascending aorta at level of main pulmonary artery: 41 mm.  Proximal descending, at the main pulmonary artery: 28 mm.  Diaphragmatic hiatus: 27 mm.     Amy and mediastinum: Normal without lymphadenopathy or mass.   "   Tracheobronchial tree: Patent.  Lungs:  Right: 4 mm right middle lobe nodule (series A3, image 62).  Left: No nodules or air space disease.  Both: Otherwise clear bilaterally.  Pleura: Normal without fluid or thickening.     Lower neck: Normal.  Chest wall and axilla : Normal.  Bones: Thoracic spondylosis without acute skeletal abnormality.     The fully visualized liver, gallbladder, stomach, spleen, and bilateral adrenal  glands are normal. The portions of the incompletely visualized pancreas,  kidneys, and small and large intestine are normal.    TRANSTHORACIC ECHO (TTE) COMPLETE 01/19/2023    Interpretation Summary    Left Ventricle: Normal ventricle size. Normal wall thickness. Muscle mass is normal. Estimated EF 60%. No regional wall motion abnormalities. Grade I diastolic dysfunction.    Right Ventricle: Normal ventricle size. Normal wall thickness.    Left Atrium: Mildly dilated atrium.    Right Atrium: Mildly dilated atrium.    Aortic Valve: Tricuspid valve.  Sclerotic leaflets.    Mitral Valve: Mitral annular calcification. Mild regurgitation.    Tricuspid Valve: Structure is grossly normal. Trace regurgitation.    Pulmonic Valve: Valve not well visualized.    Aorta: Aortic root calcified. Sinuses of Valsalva calcified. Ascending aorta calcified. Aortic arch grossly normal.    IVC/SVC: Inferior vena cava is <2.1cm. Inferior vena cava collapses >50% during inspiration.    Pericardium: Normal structure.    Left ventricle appears to be normal in dimension and motion the estimated ejection fraction 60% there is diastolic dysfunction type I.  The left atrium is mildly enlarged.  The mitral valve sclerotic.  There is mild central mitral regurgitation.  The right ventricle is normal in dimension and motion.  The right atrium is mildly enlarged.  There is a closure device seen in the intra-atrial septum.  The aortic valve has 3 cusps it is sclerotic but opens normally.  The aortic root is mildly dilated at 4.1  cm it is also calcified mildly so.  The inferior vena cava is normal in dimension and motion.        US CAROTID BILATERAL 12/03/2020    Interpretation Summary  · There is less than 50% stenosis in the left and right internal carotid arteries with at most mild heterogeneous plaque bilaterally.  · Antegrade vertebral flow bilaterally.      EKG  Normal sinus rhythm 83bpm [id number]  Minimal voltage criteria for LVH, may be normal variant if age is < 37 yrs ( R in aVL )  Inferior infarct (cited on or before 23-DEC-2021)  When compared with ECG of 24-JUL-2024 09:33,  No significant change was found                                               ASSESSMENT AND PLAN   Mr. De La Garza is an 84-year-old man with the following issues:    Assessment/Plan    Aortic aneurysm (CMS/[facility])  History of thoracic aortic aneurysm 4.1 cm on CT scan 2021.  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  There was no significant change.  Patient has been asked to make follow-up with them as he has not seen them formally.  He plans to do so in the next 6 months.  Has stable right pulmonary nodule and has been asked to follow-up with his primary care physician regarding this.  He has an upcoming appointment early next year.      Atherosclerosis of coronary artery  Coronary artery calcification noted is moderate on CT scan 2024.  Continue with aspirin and statin.  Unremarkable stress EKG 2021.  Low threshold for additional testing if patient develops symptoms.  May also consider cardiac PET CT scan.     Hyperlipidemia  Atorvastatin 40 mg daily.  Blood work 1-2024 shows LDL 67 triglycerides 61 HDL 51 normal liver function tests      Patient's echocardiogram 2023 read as closure device in interatrial septum although this is not mentioned in prior cardiology notes nor does patient recall history of this.  Attempted to review echocardiogram but images are archived.  Will  consider repeating echocardiogram.          Return in about 6 months (around 6/12/2025).         Thank you for allowing me to participate in the care of this patient.  I hope this information is helpful.         Duyen Gallego MD [facility]  12/12/2024  11:35 AM    This document was generated utilizing voice recognition technology. A reasonable attempt at proofreading has been made to minimize errors but please excuse any typographical errors which may be present. Please call with any questions.

## 2025-01-10 DIAGNOSIS — Z13.1 DIABETES MELLITUS SCREENING: ICD-10-CM

## 2025-01-10 DIAGNOSIS — E78.00 PURE HYPERCHOLESTEROLEMIA: Primary | ICD-10-CM

## 2025-01-10 DIAGNOSIS — I25.10 ATHEROSCLEROSIS OF NATIVE CORONARY ARTERY OF NATIVE HEART WITHOUT ANGINA PECTORIS: ICD-10-CM

## 2025-01-20 ENCOUNTER — HOSPITAL ENCOUNTER (OUTPATIENT)
Dept: CARDIOLOGY | Facility: CLINIC | Age: 85
Discharge: HOME | End: 2025-01-20
Attending: INTERNAL MEDICINE
Payer: MEDICARE

## 2025-01-20 VITALS
DIASTOLIC BLOOD PRESSURE: 78 MMHG | BODY MASS INDEX: 31.64 KG/M2 | HEIGHT: 70 IN | SYSTOLIC BLOOD PRESSURE: 148 MMHG | WEIGHT: 221 LBS

## 2025-01-20 DIAGNOSIS — I35.1 NONRHEUMATIC AORTIC VALVE INSUFFICIENCY: ICD-10-CM

## 2025-01-20 PROCEDURE — 93306 TTE W/DOPPLER COMPLETE: CPT | Performed by: INTERNAL MEDICINE

## 2025-01-21 ENCOUNTER — HOSPITAL ENCOUNTER (OUTPATIENT)
Dept: CARDIOLOGY | Facility: CLINIC | Age: 85
Setting detail: NUCLEAR MEDICINE
Discharge: HOME | End: 2025-01-21
Attending: INTERNAL MEDICINE
Payer: MEDICARE

## 2025-01-21 ENCOUNTER — APPOINTMENT (OUTPATIENT)
Dept: CARDIOLOGY | Facility: CLINIC | Age: 85
Setting detail: NUCLEAR MEDICINE
End: 2025-01-21
Attending: INTERNAL MEDICINE
Payer: MEDICARE

## 2025-01-21 VITALS — WEIGHT: 221 LBS | HEIGHT: 70 IN | BODY MASS INDEX: 31.64 KG/M2

## 2025-01-21 DIAGNOSIS — I35.1 NONRHEUMATIC AORTIC VALVE INSUFFICIENCY: ICD-10-CM

## 2025-01-21 DIAGNOSIS — I11.9 HYPERTENSIVE HEART DISEASE WITHOUT HEART FAILURE: ICD-10-CM

## 2025-01-21 LAB
AORTIC ROOT ANNULUS: 4.3 CM
ASCENDING AORTA: 4.2 CM
AV PEAK GRADIENT: 5 MMHG
AV PEAK VELOCITY-S: 1.12 M/S
AV VALVE AREA: 2.38 CM2
BSA FOR ECHO PROCEDURE: 2.22 M2
CUSP SEPARATION: 2 CM
E WAVE DECELERATION TIME: 275 MS
E/A RATIO: 0.6
E/E' RATIO: 8.5
E/LAT E' RATIO: 7.2
EDV (BP): 72.1 CM3
EF (A4C): 62.8 %
EF A2C: 73.3 %
EJECTION FRACTION: 67 %
EST RIGHT VENT SYSTOLIC PRESSURE BY TRICUSPID REGURGITATION JET: 29 MMHG
ESV (BP): 23.8 CM3
FRACTIONAL SHORTENING: 47.95 %
HEART RATE: 83 BPM
INTERVENTRICULAR SEPTUM: 1.18 CM
LA ESV (BP): 85.8 CM3
LA ESV INDEX (A2C): 32.43 CM3/M2
LA ESV INDEX (BP): 38.65 CM3/M2
LA/AORTA RATIO: 0.98
LAAS-AP2: 23 CM2
LAAS-AP4: 24.5 CM2
LAD 2D: 4.2 CM
LALD A4C: 5.4 CM
LALD A4C: 6.13 CM
LAV-S: 72 CM3
LEFT ATRIUM VOLUME INDEX: 41.31 CM3/M2
LEFT ATRIUM VOLUME: 91.7 CM3
LEFT INTERNAL DIMENSION IN SYSTOLE: 2.16 CM (ref 3.55–5.38)
LEFT VENTRICLE DIASTOLIC VOLUME INDEX: 38.15 CM3/M2
LEFT VENTRICLE DIASTOLIC VOLUME: 84.7 CM3
LEFT VENTRICLE SYSTOLIC VOLUME INDEX: 14.19 CM3/M2
LEFT VENTRICLE SYSTOLIC VOLUME: 31.5 CM3
LEFT VENTRICULAR INTERNAL DIMENSION IN DIASTOLE: 4.15 CM (ref 6.09–8.46)
LEFT VENTRICULAR POSTERIOR WALL IN END DIASTOLE: 1.07 CM (ref 0.76–1.41)
LV DIASTOLIC VOLUME: 58.7 CM3
LV ESV (APICAL 2 CHAMBER): 15.7 CM3
LVAD-AP2: 23.2 CM2
LVAD-AP4: 27.3 CM2
LVAS-AP2: 9.93 CM2
LVAS-AP4: 15.5 CM2
LVEDVI(A2C): 26.44 CM3/M2
LVEDVI(BP): 32.48 CM3/M2
LVESVI(A2C): 7.07 CM3/M2
LVESVI(BP): 10.72 CM3/M2
LVLD-AP2: 7.57 CM
LVLD-AP4: 7.2 CM
LVLS-AP2: 5.48 CM
LVLS-AP4: 6.36 CM
LVOT 2D: 2.4 CM
LVOT A: 4.52 CM2
LVOT PEAK VELOCITY: 0.75 M/S
LVOT PG: 2 MMHG
MLH CV ECHO AVA INDEX VELOCITY RATIO: 1.1
MV E'TISSUE VEL-LAT: 0.08 M/S
MV E'TISSUE VEL-MED: 0.07 M/S
MV PEAK A VEL: 0.95 M/S
MV PEAK E VEL: 0.6 M/S
POSTERIOR WALL: 1.07 CM
RAP: 3 MMHG
RVOT VMAX: 0.57 M/S
RVOT VTI: 11.3 CM
SEPTAL TISSUE DOPPLER FREE WALL LATE DIA VELOCITY (APICAL 4 CHAMBER VIEW): 0.12 M/S
TR MAX PG: 25.6 MMHG
TRICUSPID VALVE PEAK REGURGITATION VELOCITY: 2.53 M/S
Z-SCORE OF LEFT VENTRICULAR DIMENSION IN END DIASTOLE: -5.48
Z-SCORE OF LEFT VENTRICULAR DIMENSION IN END SYSTOLE: -5.57
Z-SCORE OF LEFT VENTRICULAR POSTERIOR WALL IN END DIASTOLE: 0.19

## 2025-01-21 PROCEDURE — 93015 CV STRESS TEST SUPVJ I&R: CPT | Performed by: INTERNAL MEDICINE

## 2025-01-21 PROCEDURE — A9502 TC99M TETROFOSMIN: HCPCS | Performed by: INTERNAL MEDICINE

## 2025-01-21 PROCEDURE — 78452 HT MUSCLE IMAGE SPECT MULT: CPT | Performed by: INTERNAL MEDICINE

## 2025-01-22 LAB
CV NM TETROFOSMIN REST DOSE: 9.3 MCI
CV NM TETROFOSMIN STRESS DOSE: 31.4 MCI
MLH CV NM REST ADMIN DATE: NORMAL MM/DD/YYYY
MLH CV NM REST ADMIN TIME: 849 HR:MIN
MLH CV NM STRESS ADMIN DATE: NORMAL MM/DD/YYYY
MLH CV NM STRESS ADMIN TIME: 1005 HR:MIN
NUC STRESS EJECTION FRACTION: 77 %
STRESS BASELINE BP: NORMAL MMHG
STRESS BASELINE HR: 70 BPM
STRESS ECHO POST RECOVERY HR: 123 BPM
STRESS PERCENT HR: 94 %
STRESS POST ESTIMATED WORKLOAD: 7 METS
STRESS POST EXERCISE DUR MIN: 4 MIN
STRESS POST EXERCISE DUR SEC: 1 SEC
STRESS POST PEAK BP: NORMAL MMHG
STRESS POST PEAK HR: 130 BPM
STRESS TARGET HR: 117 BPM

## 2025-01-23 ENCOUNTER — TELEPHONE (OUTPATIENT)
Dept: CARDIOLOGY | Facility: CLINIC | Age: 85
End: 2025-01-23

## 2025-01-23 DIAGNOSIS — R06.02 SOB (SHORTNESS OF BREATH): Primary | ICD-10-CM

## 2025-01-23 NOTE — TELEPHONE ENCOUNTER
I called patient and discussed his echocardiogram and stress test results.  Patient has plantar fasciitis but in general is sedentary regardless which she admits to.  Had pronounced shortness of breath and walking on treadmill which shows reversible defect.  We discussed observation/medical management versus additional testing such as coronary CTA or coronary angiogram.  Patient is agreeable to proceed with coronary angiogram.  Has blood work ordered by his primary care physician and will complete study at [facility].

## 2025-01-24 LAB
ALBUMIN SERPL-MCNC: 3.9 G/DL (ref 3.6–5.1)
ALBUMIN/GLOB SERPL: 1.5 (CALC) (ref 1–2.5)
ALP SERPL-CCNC: 80 U/L (ref 35–144)
ALT SERPL-CCNC: 11 U/L (ref 9–46)
AST SERPL-CCNC: 17 U/L (ref 10–35)
BASOPHILS # BLD AUTO: 40 CELLS/UL (ref 0–200)
BASOPHILS NFR BLD AUTO: 0.7 %
BILIRUB SERPL-MCNC: 0.6 MG/DL (ref 0.2–1.2)
BUN SERPL-MCNC: 10 MG/DL (ref 7–25)
BUN/CREAT SERPL: 15 (CALC) (ref 6–22)
CALCIUM SERPL-MCNC: 8.9 MG/DL (ref 8.6–10.3)
CHLORIDE SERPL-SCNC: 103 MMOL/L (ref 98–110)
CHOLEST SERPL-MCNC: 115 MG/DL
CHOLEST/HDLC SERPL: 2.1 (CALC)
CO2 SERPL-SCNC: 29 MMOL/L (ref 20–32)
CREAT SERPL-MCNC: 0.68 MG/DL (ref 0.7–1.22)
EGFRCR SERPLBLD CKD-EPI 2021: 93 ML/MIN/1.73M2
EOSINOPHIL # BLD AUTO: 262 CELLS/UL (ref 15–500)
EOSINOPHIL NFR BLD AUTO: 4.6 %
ERYTHROCYTE [DISTWIDTH] IN BLOOD BY AUTOMATED COUNT: 12.1 % (ref 11–15)
GLOBULIN SER CALC-MCNC: 2.6 G/DL (CALC) (ref 1.9–3.7)
GLUCOSE SERPL-MCNC: 100 MG/DL (ref 65–99)
HBA1C MFR BLD: 6 % OF TOTAL HGB
HCT VFR BLD AUTO: 46.7 % (ref 38.5–50)
HDLC SERPL-MCNC: 55 MG/DL
HGB BLD-MCNC: 15.6 G/DL (ref 13.2–17.1)
LDLC SERPL CALC-MCNC: 46 MG/DL (CALC)
LYMPHOCYTES # BLD AUTO: 1653 CELLS/UL (ref 850–3900)
LYMPHOCYTES NFR BLD AUTO: 29 %
MCH RBC QN AUTO: 29.9 PG (ref 27–33)
MCHC RBC AUTO-ENTMCNC: 33.4 G/DL (ref 32–36)
MCV RBC AUTO: 89.5 FL (ref 80–100)
MONOCYTES # BLD AUTO: 530 CELLS/UL (ref 200–950)
MONOCYTES NFR BLD AUTO: 9.3 %
NEUTROPHILS # BLD AUTO: 3215 CELLS/UL (ref 1500–7800)
NEUTROPHILS NFR BLD AUTO: 56.4 %
NONHDLC SERPL-MCNC: 60 MG/DL (CALC)
PLATELET # BLD AUTO: 169 THOUSAND/UL (ref 140–400)
PMV BLD REES-ECKER: 11.3 FL (ref 7.5–12.5)
POTASSIUM SERPL-SCNC: 4.5 MMOL/L (ref 3.5–5.3)
PROT SERPL-MCNC: 6.5 G/DL (ref 6.1–8.1)
RBC # BLD AUTO: 5.22 MILLION/UL (ref 4.2–5.8)
SODIUM SERPL-SCNC: 141 MMOL/L (ref 135–146)
TRIGL SERPL-MCNC: 55 MG/DL
WBC # BLD AUTO: 5.7 THOUSAND/UL (ref 3.8–10.8)

## 2025-01-30 ENCOUNTER — HOSPITAL ENCOUNTER (OUTPATIENT)
Facility: HOSPITAL | Age: 85
Setting detail: HOSPITAL OUTPATIENT SURGERY
Discharge: HOME | End: 2025-01-30
Attending: INTERNAL MEDICINE | Admitting: INTERNAL MEDICINE
Payer: MEDICARE

## 2025-01-30 VITALS
RESPIRATION RATE: 12 BRPM | TEMPERATURE: 97.5 F | SYSTOLIC BLOOD PRESSURE: 140 MMHG | HEART RATE: 70 BPM | DIASTOLIC BLOOD PRESSURE: 71 MMHG | OXYGEN SATURATION: 97 %

## 2025-01-30 DIAGNOSIS — R06.02 SOB (SHORTNESS OF BREATH): ICD-10-CM

## 2025-01-30 LAB
ATRIAL RATE: 68
P AXIS: 43
POCT ACT-LR: 384 SEC (ref 116–155)
POCT TEST: ABNORMAL
PR INTERVAL: 194
QRS DURATION: 88
QT INTERVAL: 398
QTC CALCULATION(BAZETT): 423
R AXIS: 4
T WAVE AXIS: 31
VENTRICULAR RATE: 68

## 2025-01-30 PROCEDURE — 27200000 HC STERILE SUPPLY: Performed by: INTERNAL MEDICINE

## 2025-01-30 PROCEDURE — C1887 CATHETER, GUIDING: HCPCS | Performed by: INTERNAL MEDICINE

## 2025-01-30 PROCEDURE — C1725 CATH, TRANSLUMIN NON-LASER: HCPCS | Performed by: INTERNAL MEDICINE

## 2025-01-30 PROCEDURE — 92978 ENDOLUMINL IVUS OCT C 1ST: CPT | Mod: LC | Performed by: INTERNAL MEDICINE

## 2025-01-30 PROCEDURE — 027034Z DILATION OF CORONARY ARTERY, ONE ARTERY WITH DRUG-ELUTING INTRALUMINAL DEVICE, PERCUTANEOUS APPROACH: ICD-10-PCS | Performed by: INTERNAL MEDICINE

## 2025-01-30 PROCEDURE — B240ZZ3 ULTRASONOGRAPHY OF SINGLE CORONARY ARTERY, INTRAVASCULAR: ICD-10-PCS | Performed by: INTERNAL MEDICINE

## 2025-01-30 PROCEDURE — 99152 MOD SED SAME PHYS/QHP 5/>YRS: CPT | Performed by: INTERNAL MEDICINE

## 2025-01-30 PROCEDURE — 71000001 HC PACU PHASE 1 INITIAL 30MIN: Performed by: INTERNAL MEDICINE

## 2025-01-30 PROCEDURE — C1894 INTRO/SHEATH, NON-LASER: HCPCS | Performed by: INTERNAL MEDICINE

## 2025-01-30 PROCEDURE — 93005 ELECTROCARDIOGRAM TRACING: CPT | Performed by: INTERNAL MEDICINE

## 2025-01-30 PROCEDURE — 92928 PRQ TCAT PLMT NTRAC ST 1 LES: CPT | Mod: LC | Performed by: INTERNAL MEDICINE

## 2025-01-30 PROCEDURE — B2111ZZ FLUOROSCOPY OF MULTIPLE CORONARY ARTERIES USING LOW OSMOLAR CONTRAST: ICD-10-PCS | Performed by: INTERNAL MEDICINE

## 2025-01-30 PROCEDURE — C1769 GUIDE WIRE: HCPCS | Performed by: INTERNAL MEDICINE

## 2025-01-30 PROCEDURE — 63700000 HC SELF-ADMINISTRABLE DRUG: Performed by: INTERNAL MEDICINE

## 2025-01-30 PROCEDURE — 93010 ELECTROCARDIOGRAM REPORT: CPT | Performed by: INTERNAL MEDICINE

## 2025-01-30 PROCEDURE — 93454 CORONARY ARTERY ANGIO S&I: CPT | Mod: XU | Performed by: INTERNAL MEDICINE

## 2025-01-30 PROCEDURE — 85347 COAGULATION TIME ACTIVATED: CPT | Performed by: INTERNAL MEDICINE

## 2025-01-30 PROCEDURE — 63700000 HC SELF-ADMINISTRABLE DRUG: Performed by: NURSE PRACTITIONER

## 2025-01-30 PROCEDURE — C1753 CATH, INTRAVAS ULTRASOUND: HCPCS | Performed by: INTERNAL MEDICINE

## 2025-01-30 PROCEDURE — 63600000 HC DRUGS/DETAIL CODE: Performed by: INTERNAL MEDICINE

## 2025-01-30 PROCEDURE — 71000011 HC PACU PHASE 1 EA ADDL MIN: Performed by: INTERNAL MEDICINE

## 2025-01-30 PROCEDURE — C1874 STENT, COATED/COV W/DEL SYS: HCPCS | Performed by: INTERNAL MEDICINE

## 2025-01-30 PROCEDURE — 93005 ELECTROCARDIOGRAM TRACING: CPT | Performed by: NURSE PRACTITIONER

## 2025-01-30 PROCEDURE — 25000000 HC PHARMACY GENERAL: Performed by: INTERNAL MEDICINE

## 2025-01-30 PROCEDURE — 63600105 HC IODINE BASED CONTRAST: Performed by: INTERNAL MEDICINE

## 2025-01-30 PROCEDURE — 92978 ENDOLUMINL IVUS OCT C 1ST: CPT | Mod: 26,LC | Performed by: INTERNAL MEDICINE

## 2025-01-30 PROCEDURE — C9607 PERC D-E COR REVASC CHRO SIN: HCPCS | Mod: LC | Performed by: INTERNAL MEDICINE

## 2025-01-30 DEVICE — EVEROLIMUS-ELUTING PLATINUM CHROMIUM CORONARY STENT SYSTEM
Type: IMPLANTABLE DEVICE | Site: CORONARY | Status: FUNCTIONAL
Brand: SYNERGY™ XD

## 2025-01-30 RX ORDER — CLOPIDOGREL BISULFATE 75 MG/1
TABLET ORAL
Status: DISCONTINUED | OUTPATIENT
Start: 2025-01-30 | End: 2025-01-30 | Stop reason: HOSPADM

## 2025-01-30 RX ORDER — HEPARIN SODIUM 1000 [USP'U]/ML
INJECTION, SOLUTION INTRAVENOUS; SUBCUTANEOUS
Status: DISCONTINUED | OUTPATIENT
Start: 2025-01-30 | End: 2025-01-30 | Stop reason: HOSPADM

## 2025-01-30 RX ORDER — FENTANYL CITRATE 50 UG/ML
INJECTION, SOLUTION INTRAMUSCULAR; INTRAVENOUS
Status: DISCONTINUED | OUTPATIENT
Start: 2025-01-30 | End: 2025-01-30 | Stop reason: HOSPADM

## 2025-01-30 RX ORDER — ASPIRIN 325 MG
325 TABLET ORAL ONCE
Status: COMPLETED | OUTPATIENT
Start: 2025-01-30 | End: 2025-01-30

## 2025-01-30 RX ORDER — MIDAZOLAM HYDROCHLORIDE 2 MG/2ML
INJECTION, SOLUTION INTRAMUSCULAR; INTRAVENOUS
Status: DISCONTINUED | OUTPATIENT
Start: 2025-01-30 | End: 2025-01-30 | Stop reason: HOSPADM

## 2025-01-30 RX ORDER — LIDOCAINE HYDROCHLORIDE 10 MG/ML
INJECTION, SOLUTION INFILTRATION; PERINEURAL
Status: DISCONTINUED | OUTPATIENT
Start: 2025-01-30 | End: 2025-01-30 | Stop reason: HOSPADM

## 2025-01-30 RX ORDER — IOPAMIDOL 612 MG/ML
INJECTION, SOLUTION INTRAVASCULAR
Status: DISCONTINUED | OUTPATIENT
Start: 2025-01-30 | End: 2025-01-30 | Stop reason: HOSPADM

## 2025-01-30 RX ORDER — SODIUM CHLORIDE 9 MG/ML
40 INJECTION, SOLUTION INTRAVENOUS CONTINUOUS
Status: DISCONTINUED | OUTPATIENT
Start: 2025-01-30 | End: 2025-01-30 | Stop reason: HOSPADM

## 2025-01-30 RX ORDER — CLOPIDOGREL BISULFATE 75 MG/1
75 TABLET ORAL DAILY
Qty: 30 TABLET | Refills: 6 | Status: SHIPPED | OUTPATIENT
Start: 2025-01-30 | End: 2026-01-30

## 2025-01-30 RX ADMIN — ASPIRIN 325 MG: 325 TABLET ORAL at 08:03

## 2025-01-30 NOTE — Clinical Note
Closure device placed for the right radial artery. Closure device used: radial compression system. Closure pressure manually applied.

## 2025-01-30 NOTE — POST-PROCEDURE NOTE
Cardiovascular Post Procedure Note:    Naif De La Garza  1/30/2025    Pre-op Diagnosis      * SOB (shortness of breath) [R06.02]   Post-op Diagnosis     * SOB (shortness of breath) [R06.02]     Procedure(s):  LEFT HEART CATH WITH CORONARY ANGIOGRAPHY  IVUS - coronary   Surgeon(s):  Sunil Grimm MD Olleik, Farah, MD Hafeez, Hamza, MD    Left Heart Catheterization:    Left main: Patent  LAD: Mid LAD has 40% stenosis  LCx: Chronically occluded in its mid segment. Left to left collaterals were noted   RCA: Mid RCA has 30-40% stenosis      Intervention:    Successful  intervention of mid left circumflex. Mid LCx was stented with Synergy 2.25 x 32 mm NABEEL. Postdilatation was performed with NC Emerge 2.50 x 20 mm and 3.25 x 8 mm balloons. Final angiography revealed 0% residual stenosis and MARY grade III flow distally.    Heparin was administered. Therapeutic anticoagulation was confirmed per ACT.       At the end of the procedure, hemostasis was achieved using TR band.     Recommendations:    1. Dual antiplatelet therapy with Aspirin 81 mg daily and Plavix 75 mg daily  2. Lifestyle and risk factor modifications  3. Outpatient cardiac rehabilitation     Anesthesia:  Moderate Sedation    Staff:   Circulator: Violetta Rivera RN  Documenter: Valeriy Elizondo, CVT     Estimated Blood Loss:   22 mL     Specimens:   No specimens collected during this procedure.     Implants:   Implant Name Type Inv. Item Serial No.  Lot No. LRB No. Used Action   STENT SYNERGY XD MR US [id number] - SSYNERGY - [id number] Stents Drug Eluting STENT SYNERGY XD MR US [id number] SYNERGY [employer] [id number] N/A 1 Implanted        Complications:  None    Date: 1/30/2025 Time: 10:14 AM

## 2025-01-30 NOTE — POST-PROCEDURE NOTE
Same Day Discharge    EKG completed and reviewed by physician 4 hrs post procedure. Patient completed and tolerated 100 meter ambulation around unit.     Discharge instructions given with the emphasis on importance of follow-up visit with physician, evaluation of access site until healed and recognition of signs and symptoms of ACS.     Patient had prescription filled prior to discharge.   Antiplatelet Regimen: aspirin 81 mg, plavix 75 mg     R radial site C/D/I no bleeding and no hematoma. Pt ambulated, post EKG completed and OK by MD. No chest pain or SOB. Pt IV removed. RN reviewed all discharge instructions with Pt, Pt verbalized understanding. Pt wife picked up plavix prescription. Pt wife transported home, Pt left with all of his belongings. Pt discharged

## 2025-01-30 NOTE — PROGRESS NOTES
S/p cardiac catheterization with PCI of cx  No cp or sob  Discussed new medication: Plavix 75 mg daily  Anticipate dc home today if remains stable  Follow up with DR Julián Gallego also stopped and spoke to pt and wife

## 2025-01-30 NOTE — DISCHARGE INSTRUCTIONS
Post cardiac catheterization instructions:  No driving, operating heavy machinery, making critical decisions or activities that require balance for 24 hours.  This is because of sedation you received for your procedure.  On day of discharge, limit activities.  No heavy lifting greater than 10 lbs for the next 2 days after procedure.    Remove dressing next day. Keep site clean and dry.  May shower next day of procedure. Do not submerge catherization site in pool or tub baths for 5 days  Call if  swelling, bleeding, fever or drainage from catheterization site        Medications: Take medications as directed.  Call your physician in any questions or concerns    New medication: Plavix 75 mg daily         Follow up with  Dr Gallego  [phone]  Follow up in 2 weeks.

## 2025-01-30 NOTE — ASSESSMENT & PLAN NOTE
In 2017 he had a calcium CT scan with a calcium score of 794.  CT scan done in 2024 reported moderate coronary calcifications.   Pt notes he feels a little winded with activity. No cp   Stress test his is an abnormal exercise nuclear stress test.  Diminished exercise capacity.  Exercise EKG negative for ischemia.  Accelerated heart rate response suggestive of deconditioning.  Frequent PVCs and ventricular couplet noted during exercise.  Perfusion images demonstrate a moderate to large area of moderately to severely reduced isotope uptake in the basal and mid inferolateral, mid anterolateral and apical lateral segments with significant reversibility at rest.  Findings are most consistent with ischemia in the territory of the left circumflex coronary artery.  Hyperdynamic LV function with LVEF 77%.  No evidence of transient ischemic dilatation.  Wall motion appears grossly normal.  Creat 0.68

## 2025-01-30 NOTE — PRE-PROCEDURE NOTE
Cardiac Cath Lab Pre-procedure Note    - Patient was seen and examined at bedside.  - The patient's chart and all data was reviewed.  - The procedure, treatment alternatives, risks and benefits were explained with specific risks discussed.  - Patient was consented for cardiac cath procedure and possible PCI.  - Patient's case was found appropriate for dual antiplatelet therapy.    Indication for procedure: Pre-Op Diagnosis Codes:      * SOB (shortness of breath) [R06.02]    Patient's clinical presentation to the cardiac cath lab: TOLENTINO is anginal equivalent.      Patient appears to be managing well.                 Pre-sedation assessment  ASA 3  Mallampati class: III - soft palate, base of uvula visible.

## 2025-01-30 NOTE — Clinical Note
The right groin and right radial was clipped, marked and prepped with ChloraPrep. The patient was draped in a sterile fashion after allowing for the recommended dry time.

## 2025-01-30 NOTE — H&P
Cardiology History and Physical     Admitting Diagnosis   SOB (shortness of breath) [R06.02]   HPI    Naif De La Garza is a 84 y.o. male with PMH of aortic aneurysm, hyperlipidemia who presents to [facility] for cardiac catheterization. Pt n 2017 he had a calcium CT scan with a calcium score of 794.  CT scan done in 2024 reported moderate coronary calcifications. Pt notes he feels a little winded with activity. No cp. Stress test his is an abnormal exercise nuclear stress test.Diminished exercise capacity.  Exercise EKG negative for ischemia.Accelerated heart rate response suggestive of deconditioning.  Frequent PVCs and ventricular couplet noted during exercise.Perfusion images demonstrate a moderate to large area of moderately to severely reduced isotope uptake in the basal and mid inferolateral, mid anterolateral and apical lateral segments with significant reversibility at rest.Findings are most consistent with ischemia in the territory of the left circumflex coronary artery.Hyperdynamic LV function with LVEF 77%.  No evidence of transient ischemic dilatation.  Wall motion appears grossly normal.    For cardiac catheterization today       Medical History   Medical History:   Past Medical History:   Diagnosis Date    Aortic aneurysm (CMS/HCC)     Coronary artery disease     History of colon cancer     Hyperlipidemia     Lipid disorder        Surgical History:   Past Surgical History   Procedure Laterality Date    Bunionectomy Left 2009    Cataract extraction Bilateral 07/2023    Colon surgery  2002    colon cancer resection       Allergies: Quinolones    Current Facility-Administered Medications   Medication Dose Route Frequency Provider Last Rate Last Admin    sodium chloride 0.9 % infusion  40 mL/hr intravenous Continuous Anahi Burris CRNP           Social History:   Social History     Socioeconomic History    Marital status:    Tobacco Use    Smoking status: Former     Current packs/day: 0.00      Average packs/day: 1 pack/day for 15.0 years (15.0 ttl pk-yrs)     Types: Cigarettes     Start date:      Quit date:      Years since quittin.1    Smokeless tobacco: Never    Tobacco comments:     Worked for [employer] for 35 years. Did not wear masks.    Vaping Use    Vaping status: Never Used   Substance and Sexual Activity    Alcohol use: Yes     Comment: occasionally    Drug use: No    Sexual activity: Defer       Family History:   Family History   Problem Relation Name Age of Onset    Breast cancer Biological Mother      Stomach cancer Biological Father      Parkinsonism Biological Brother           Review of Systems   All other systems reviewed and negative except as noted in the HPI.   Objective    Vital Signs for the last 24 hours   Temp:  [36.4 °C (97.5 °F)] 36.4 °C (97.5 °F)       Physical Exam   Visit Vitals  Temp 36.4 °C (97.5 °F) (Tympanic)     General appearance: alert, appears stated age, and cooperative  Head: normocephalic, without obvious abnormality, atraumatic  Lungs: clear to auscultation bilaterally  Heart: regular rate and rhythm, S1, S2 normal, no murmur, click, rub or gallop  Abdomen: soft, non-tender; bowel sounds normal; no masses, no organomegaly  Extremities: extremities normal, warm and well-perfused; no cyanosis, clubbing, or edema  Pulses: 2+ and symmetric bilateral DP  Skin: Skin color, texture, turgor normal. No rashes or lesions  Neurologic: Grossly normal      Labs   Lab Results   Component Value Date    WBC 5.7 2025    HGB 15.6 2025    HCT 46.7 2025     2025    CHOL 115 2025    TRIG 55 2025    HDL 55 2025    ALT 11 2025    AST 17 2025     2025    K 4.5 2025     2025    CREATININE 0.68 (L) 2025    BUN 10 2025    CO2 29 2025    HGBA1C 6.0 (H) 2025     Troponin I Results    No lab values to display.           Imaging        Cardiac  Imaging    TRANSTHORACIC ECHO (TTE) COMPLETE 01/20/2025    Interpretation Summary    Left Ventricle: Normal ventricle size. Normal wall thickness. Preserved systolic function. Estimated EF 65-70%. No regional wall motion abnormalities. Diastolic inflow pattern consistent with impaired relaxation. Normal left atrial pressure.    Right Ventricle: Mildly dilated ventricle size. Normal systolic function.    Left Atrium: Mildly dilated atrium.    Right Atrium: Mildly dilated atrium.    Aortic Valve: Tricuspid valve.  Sclerotic leaflets. No regurgitation. No stenosis.    Mitral Valve: Normal leaflet structure. Normal leaflet motion. Trace regurgitation. No stenosis.    Tricuspid Valve: Normal structure. Trace regurgitation. Estimated RVSP = 29 mmHg.    Aorta: Aortic arch normal-sized. Descending aorta normal-sized. Dilatation at the sinuses of Valsalva=4.3cm. Dilatation of the ascending aorta=4.2cm.    Pericardium: No evidence of pericardial effusion.    Cardiac Imaging    US CAROTID BILATERAL 12/03/2020    Interpretation Summary  · There is less than 50% stenosis in the left and right internal carotid arteries with at most mild heterogeneous plaque bilaterally.  · Antegrade vertebral flow bilaterally.       ECG     SR    Telemetry   sinus rhythm      Assessment/Plan      Atherosclerosis of coronary artery     In 2017 he had a calcium CT scan with a calcium score of 794.  CT scan done in 2024 reported moderate coronary calcifications.   Pt notes he feels a little winded with activity. No cp   Stress test his is an abnormal exercise nuclear stress test.  Diminished exercise capacity.  Exercise EKG negative for ischemia.  Accelerated heart rate response suggestive of deconditioning.  Frequent PVCs and ventricular couplet noted during exercise.  Perfusion images demonstrate a moderate to large area of moderately to severely reduced isotope uptake in the basal and mid inferolateral, mid anterolateral and apical lateral  segments with significant reversibility at rest.  Findings are most consistent with ischemia in the territory of the left circumflex coronary artery.  Hyperdynamic LV function with LVEF 77%.  No evidence of transient ischemic dilatation.  Wall motion appears grossly normal.  Creat 0.68        Hyperlipidemia    On lipitor     Aortic aneurysm (CMS/HCC)  History of thoracic aortic aneurysm 4.1 cm on CT scan 2021.  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  There was no significant change per Dr Gallego's note                   JUDITH Lamas  1/30/2025  8:07 AM

## 2025-01-30 NOTE — ASSESSMENT & PLAN NOTE
History of thoracic aortic aneurysm 4.1 cm on CT scan 2021.  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  There was no significant change per Dr Gallego's note

## 2025-01-31 LAB
ATRIAL RATE: 75
P AXIS: 38
PR INTERVAL: 196
QRS DURATION: 88
QT INTERVAL: 388
QTC CALCULATION(BAZETT): 433
R AXIS: -5
T WAVE AXIS: 15
VENTRICULAR RATE: 75

## 2025-01-31 PROCEDURE — 93010 ELECTROCARDIOGRAM REPORT: CPT | Performed by: INTERNAL MEDICINE

## 2025-02-05 ENCOUNTER — OFFICE VISIT (OUTPATIENT)
Dept: CARDIOLOGY | Facility: CLINIC | Age: 85
End: 2025-02-05
Payer: MEDICARE

## 2025-02-05 VITALS
WEIGHT: 221 LBS | SYSTOLIC BLOOD PRESSURE: 120 MMHG | DIASTOLIC BLOOD PRESSURE: 68 MMHG | BODY MASS INDEX: 32.73 KG/M2 | HEART RATE: 80 BPM | HEIGHT: 69 IN | RESPIRATION RATE: 16 BRPM

## 2025-02-05 DIAGNOSIS — R06.02 SOB (SHORTNESS OF BREATH): Primary | ICD-10-CM

## 2025-02-05 DIAGNOSIS — I25.10 ATHEROSCLEROSIS OF NATIVE CORONARY ARTERY OF NATIVE HEART WITHOUT ANGINA PECTORIS: ICD-10-CM

## 2025-02-05 LAB
ATRIAL RATE: 81
P AXIS: 49
PR INTERVAL: 204
QRS DURATION: 86
QT INTERVAL: 378
QTC CALCULATION(BAZETT): 439
R AXIS: 3
T WAVE AXIS: 24
VENTRICULAR RATE: 81

## 2025-02-05 PROCEDURE — G2211 COMPLEX E/M VISIT ADD ON: HCPCS | Performed by: INTERNAL MEDICINE

## 2025-02-05 PROCEDURE — 93000 ELECTROCARDIOGRAM COMPLETE: CPT | Performed by: INTERNAL MEDICINE

## 2025-02-05 PROCEDURE — 99214 OFFICE O/P EST MOD 30 MIN: CPT | Performed by: INTERNAL MEDICINE

## 2025-02-05 NOTE — ASSESSMENT & PLAN NOTE
Atorvastatin 40 mg daily.  Blood work 1-2025 LDL 46 HDL 55 triglycerides 55 normal liver function tests

## 2025-02-05 NOTE — PROGRESS NOTES
Cardiology Outpatient Note    [facility] HEART [facility] Office  [address] [place] [address]     [facility]  The Heart [facility] Level  [address] [place] [address]     TEL  [phone]  [facility]     Naif De La Garza is a 84 y.o. male patient with history of hyperlipidemia and coronary artery calcifications.  He was noted to have mild calcifications on chest CT 2021.  Had unremarkable treadmill stress test at that time when he was seeing Dr. Barrow.  In 2017 he had a calcium CT scan with a calcium score of 794.  CT scan done in 2024 reported moderate coronary calcifications.  Patient had exercise nuclear stress test which was abnormal.  He underwent cardiac catheterization with PCI of the left circumflex.  Tolerated procedure well.  No chest pain shortness of breath or palpitations.  No wrist discomfort at radial site.    No history of major surgeries or blood transfusions.  Previous smoker and stopped in 1985.  No clear family history of accelerated coronary artery disease.    Patient asked a number of questions today in terms of being able to have upcoming dental cleaning (patient should not stop his aspirin and Plavix for this), we discussed decreasing his alcohol intake-he normally has 4 beers while watching football games and a glass of wine at least a few times per week.  He is also working on increasing his overall activity.                                                           PMH     Medical History:  Past Medical History:   Diagnosis Date    Aortic aneurysm (CMS/HCC)     Coronary artery disease     History of colon cancer     Hyperlipidemia     Lipid disorder        Surgical History:  Past Surgical History   Procedure Laterality Date    Bunionectomy Left 2009    Cataract extraction Bilateral 07/2023    Colon surgery  2002    colon cancer resection    IVUS - coronary N/A 1/30/2025    Performed by Sunil Grimm MD at  [facility] [place] CARDIAC CATH/EP    LEFT HEART CATH WITH CORONARY ANGIOGRAPHY N/A 2025    Performed by Sunil Grimm MD at [facility] [place] CARDIAC CATH/EP    Stent NABEEL w/ coronary revascularization for  - initial vessel N/A 2025    Performed by Sunil Grimm MD at [facility] [place] CARDIAC CATH/EP       Social History:  Social History     Tobacco Use    Smoking status: Former     Current packs/day: 0.00     Average packs/day: 1 pack/day for 15.0 years (15.0 ttl pk-yrs)     Types: Cigarettes     Start date:      Quit date:      Years since quittin.1    Smokeless tobacco: Never    Tobacco comments:     Worked for [employer] for 35 years. Did not wear masks.    Vaping Use    Vaping status: Never Used   Substance Use Topics    Alcohol use: Yes     Comment: occasionally    Drug use: No       Family History: He indicated that his biological mother is . He indicated that his biological father is . He indicated that the status of his biological brother is unknown.      Allergies:Quinolones    Current Medications:    Outpatient Encounter Medications as of 2025:     clopidogreL (PLAVIX) 75 mg tablet, Take 1 tablet (75 mg total) by mouth daily.    cholecalciferol, vitamin D3, 1,000 unit (25 mcg) tablet, Take 1,000 Units by mouth daily.    atorvastatin (LIPITOR) 40 mg tablet, TAKE 1 TABLET (40 MG TOTAL) BY MOUTH DAILY.    ubidecarenone (CO Q-10 ORAL), Take 1 tablet by mouth daily.    triamcinolone (KENALOG) 0.1 % ointment, APPLY TO THE AFFECTED AREA(S) TWICE DAILY FOR UP TO TWO WEEKS AS NEEDED FOR eczema    aspirin 81 mg enteric coated tablet, Take 81 mg by mouth daily.    vitamins  A,C,E-zinc-copper 14,320-226-200 unit-mg-unit capsule, Take 2 capsules by mouth daily.    [DISCONTINUED] sodium chloride 0.9 % infusion,     [] aspirin tablet 325 mg,     [DISCONTINUED] fentaNYL (PF) (SUBLIMAZE) injection,     [DISCONTINUED] midazolam (VERSED) 1 mg/mL injection,     [DISCONTINUED] lidocaine  "(XYLOCAINE) 10 mg/mL (1 %) injection,     [DISCONTINUED] heparin (porcine) injection,     [DISCONTINUED] nitroglycerin 50 mcg/mL injection,     [DISCONTINUED] clopidogreL (PLAVIX) tablet,     [DISCONTINUED] iopamidoL (ISOVUE-300) 300 mg iodine /mL (61 %) injection,                                                           OBJECTIVE   ROS as in HPI   Constitution: Negative for chills and fever.   Eyes: Negative for blurred vision and visual disturbance.   Cardiovascular: Negative for anginal chest pain, near-syncope, palpitations and syncope.   Respiratory: Negative for hemoptysis or shortness of breath  Hematologic/Lymphatic: Negative for bleeding problem.   Skin: Negative for rash. No new skin changes  Gastrointestinal: Negative for abdominal pain, diarrhea, hematochezia, melena, nausea and vomiting.   Genitourinary: Negative for dysuria and hematuria.   Neurological: Negative for headaches.            Vitals:    02/05/25 1444   BP: 120/68   BP Location: Left upper arm   Patient Position: Sitting   Pulse: 80   Resp: 16   Weight: 100 kg (221 lb)   Height: 1.753 m (5' 9\")       BP Readings from Last 3 Encounters:   02/05/25 120/68   01/30/25 (!) 140/71   01/20/25 (!) 148/78     Wt Readings from Last 3 Encounters:   02/05/25 100 kg (221 lb)   01/21/25 100 kg (221 lb)   01/20/25 100 kg (221 lb)       Physical Exam   Constitutional: Appears comfortable in no distress  HEENT:  Neck Supple.  No JVD No carotid bruits   Head: Normocephalic.   Eyes: Extraocular movements intact  Cardiovascular: Normal rate, regular rhythm no S3 gallop Exam reveals no friction rub.    Pulmonary/Chest: Effort normal and breath sounds normal. No wheezes.  Abdominal: Soft and nontender. Bowel sounds are normal.   Musculoskeletal: No lower extremity edema.  Radial pulses equal bilaterally.  No hematoma.  Neurological: Alert and oriented to person, place, and time.   Skin: Skin is warm and dry.   Psychiatric: Behavior is normal.          " "  Objective   Lab Results   Component Value Date    CHOL 115 01/24/2025     Lab Results   Component Value Date    HDL 55 01/24/2025     Lab Results   Component Value Date    LDLCALC 46 01/24/2025     Lab Results   Component Value Date    TRIG 55 01/24/2025     Lab Results   Component Value Date    ALT 11 01/24/2025    AST 17 01/24/2025     Lab Results   Component Value Date    WBC 5.7 01/24/2025    HGB 15.6 01/24/2025    HCT 46.7 01/24/2025     01/24/2025     Lab Results   Component Value Date    GLUCOSE 100 (H) 01/24/2025     01/24/2025    K 4.5 01/24/2025    CO2 29 01/24/2025     01/24/2025    BUN 10 01/24/2025    CREATININE 0.68 (L) 01/24/2025     Lab Results   Component Value Date    HGBA1C 6.0 (H) 01/24/2025     No results found for: \"TSH\"  No results found for: \"BNP\"  [unfilled]    Troponin I Results    No lab values to display.       No results found for: \"HSTROPONINI\"                                                       IMAGING   Left Heart Catheterization 1-30-25     Left main: Patent  LAD: Mid LAD has 40% stenosis  LCx: Chronically occluded in its mid segment. Left to left collaterals were noted   RCA: Mid RCA has 30-40% stenosis      Intervention:     Successful  intervention of mid left circumflex. Mid LCx was stented with Synergy 2.25 x 32 mm NABEEL. Postdilatation was performed with NC Emerge 2.50 x 20 mm and 3.25 x 8 mm balloons. Final angiography revealed 0% residual stenosis and MARY grade III flow distally.    Exercise nuclear stress test 1-  This is an abnormal exercise nuclear stress test.  Diminished exercise capacity.  Exercise EKG negative for ischemia.  Accelerated heart rate response suggestive of deconditioning.  Frequent PVCs and ventricular couplet noted during exercise.  Perfusion images demonstrate a moderate to large area of moderately to severely reduced isotope uptake in the basal and mid inferolateral, mid anterolateral and apical lateral segments with " significant reversibility at rest.  Findings are most consistent with ischemia in the territory of the left circumflex coronary artery.  Hyperdynamic LV function with LVEF 77%.  No evidence of transient ischemic dilatation.  Wall motion appears grossly normal.       TRANSTHORACIC ECHO (TTE) COMPLETE 1-20-25    Left Ventricle: Normal ventricle size. Normal wall thickness. Preserved systolic function. Estimated EF 65-70%. No regional wall motion abnormalities. Diastolic inflow pattern consistent with impaired relaxation. Normal left atrial pressure.    Right Ventricle: Mildly dilated ventricle size. Normal systolic function.    Left Atrium: Mildly dilated atrium.    Right Atrium: Mildly dilated atrium.    Aortic Valve: Tricuspid valve.  Sclerotic leaflets. No regurgitation. No stenosis.    Mitral Valve: Normal leaflet structure. Normal leaflet motion. Trace regurgitation. No stenosis.    Tricuspid Valve: Normal structure. Trace regurgitation. Estimated RVSP = 29 mmHg.    Aorta: Aortic arch normal-sized. Descending aorta normal-sized. Dilatation at the sinuses of Valsalva=4.3cm. Dilatation of the ascending aorta=4.2cm.    Pericardium: No evidence of pericardial effusion.    Reviewed study-no interatrial septal closure device noted      US CAROTID BILATERAL 12/03/2020    Interpretation Summary  · There is less than 50% stenosis in the left and right internal carotid arteries with at most mild heterogeneous plaque bilaterally.  · Antegrade vertebral flow bilaterally.      EKG  Normal sinus rhythm  Inferior infarct (cited on or before 23-DEC-2021)  When compared with ECG of 30-JAN-2025 13:30,  No significant change was found                                            ASSESSMENT AND PLAN   Mr. De La Garza is an 84-year-old man with the following issues:    Assessment/Plan    Atherosclerosis of coronary artery  Coronary artery calcification noted is moderate on CT scan 2024.  Stress test showed reversible defect and patient had  cardiac catheterization 1-30-25 with PCI of left circumflex.    -Aspirin and Plavix for 1 year until 1-.  Patient does not have abnormal bleeding or bruising currently  -Continue with atorvastatin 40 mg daily  -Patient offered cardiac rehabilitation but prefers to walk and do exercise on his own.    Aortic aneurysm (CMS/HCC)  History of thoracic aortic aneurysm 4.1 cm on CT scan 2021.  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  There was no significant change.  Has upcoming follow-up with CT surgery surveillance clinic.  Has stable right pulmonary nodule and has been asked to follow-up with his primary care physician regarding this.      Hyperlipidemia  Atorvastatin 40 mg daily.  Blood work 1-2025 LDL 46 HDL 55 triglycerides 55 normal liver function tests    Patient had a list of questions written down by his wife Beverley.  We discussed all the questions.  I offered to call her but patient states he will tell his wife himself.  If she has any questions she may call the office.        Return june 2025.         Thank you for allowing me to participate in the care of this patient.  I hope this information is helpful.         Duyen Gallego MD [facility]  2/5/2025  3:23 PM    This document was generated utilizing voice recognition technology. A reasonable attempt at proofreading has been made to minimize errors but please excuse any typographical errors which may be present. Please call with any questions.

## 2025-02-05 NOTE — ASSESSMENT & PLAN NOTE
History of thoracic aortic aneurysm 4.1 cm on CT scan 2021.  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  There was no significant change.  Has upcoming follow-up with CT surgery surveillance clinic.  Has stable right pulmonary nodule and has been asked to follow-up with his primary care physician regarding this.

## 2025-02-05 NOTE — LETTER
February 5, 2025     Yoly Payne MD  [address] [address]    Patient: Naif De La Garza  YOB: 1940  Date of Visit: 2/5/2025      Dear Dr. Payne:    Thank you for referring Naif De La Garza to me for evaluation. Below are my notes for this consultation.    If you have questions, please do not hesitate to call me. I look forward to following your patient along with you.         Sincerely,        Duyen Gallego MD        CC: No Recipients    Duyen Gallego MD  2/5/2025  3:25 PM  Sign when Signing Visit       Cardiology Outpatient Note    [facility] Office  [address] [id number]     [facility]  The Heart [facility] Level  [address] [place] [address]     TEL  [phone]  [facility]/mlhc     Naif De La Garza is a 84 y.o. male patient with history of hyperlipidemia and coronary artery calcifications.  He was noted to have mild calcifications on chest CT 2021.  Had unremarkable treadmill stress test at that time when he was seeing Dr. Barrow.  In 2017 he had a calcium CT scan with a calcium score of 794.  CT scan done in 2024 reported moderate coronary calcifications.  Patient had exercise nuclear stress test which was abnormal.  He underwent cardiac catheterization with PCI of the left circumflex.  Tolerated procedure well.  No chest pain shortness of breath or palpitations.  No wrist discomfort at radial site.    No history of major surgeries or blood transfusions.  Previous smoker and stopped in 1985.  No clear family history of accelerated coronary artery disease.    Patient asked a number of questions today in terms of being able to have upcoming dental cleaning (patient should not stop his aspirin and Plavix for this), we discussed decreasing his alcohol intake-he normally has 4 beers while watching football games and a glass of wine at least a few times per week.  He is  also working on increasing his overall activity.                                                           [facility]     Medical History:  Past Medical History:   Diagnosis Date   • Aortic aneurysm (CMS/HCC)    • Coronary artery disease    • History of colon cancer    • Hyperlipidemia    • Lipid disorder        Surgical History:  Past Surgical History   Procedure Laterality Date   • Bunionectomy Left    • Cataract extraction Bilateral 2023   • Colon surgery      colon cancer resection   • IVUS - coronary N/A 2025    Performed by Sunil Grimm MD at [facility] [place] CATH/EP   • LEFT HEART CATH WITH CORONARY ANGIOGRAPHY N/A 2025    Performed by Sunil Grimm MD at [facility] CARDIAC CATH/EP   • Stent NABEEL w/ coronary revascularization for  - initial vessel N/A 2025    Performed by Sunil Grimm MD at [facility] CARDIAC CATH/EP       Social History:  Social History     Tobacco Use   • Smoking status: Former     Current packs/day: 0.00     Average packs/day: 1 pack/day for 15.0 years (15.0 ttl pk-yrs)     Types: Cigarettes     Start date:      Quit date:      Years since quittin.1   • Smokeless tobacco: Never   • Tobacco comments:     Worked for [employer] for 35 years. Did not wear masks.    Vaping Use   • Vaping status: Never Used   Substance Use Topics   • Alcohol use: Yes     Comment: occasionally   • Drug use: No       Family History: He indicated that his biological mother is . He indicated that his biological father is . He indicated that the status of his biological brother is unknown.      Allergies:Quinolones    Current Medications:    Outpatient Encounter Medications as of 2025:   •  clopidogreL (PLAVIX) 75 mg tablet, Take 1 tablet (75 mg total) by mouth daily.  •  cholecalciferol, vitamin D3, 1,000 unit (25 mcg) tablet, Take 1,000 Units by mouth daily.  •  atorvastatin (LIPITOR) 40 mg tablet, TAKE 1 TABLET (40 MG TOTAL) BY MOUTH DAILY.  •   "ubidecarenone (CO Q-10 ORAL), Take 1 tablet by mouth daily.  •  triamcinolone (KENALOG) 0.1 % ointment, APPLY TO THE AFFECTED AREA(S) TWICE DAILY FOR UP TO TWO WEEKS AS NEEDED FOR eczema  •  aspirin 81 mg enteric coated tablet, Take 81 mg by mouth daily.  •  vitamins  A,C,E-zinc-copper 14,320-226-200 unit-mg-unit capsule, Take 2 capsules by mouth daily.  •  [DISCONTINUED] sodium chloride 0.9 % infusion,   •  [] aspirin tablet 325 mg,   •  [DISCONTINUED] fentaNYL (PF) (SUBLIMAZE) injection,   •  [DISCONTINUED] midazolam (VERSED) 1 mg/mL injection,   •  [DISCONTINUED] lidocaine (XYLOCAINE) 10 mg/mL (1 %) injection,   •  [DISCONTINUED] heparin (porcine) injection,   •  [DISCONTINUED] nitroglycerin 50 mcg/mL injection,   •  [DISCONTINUED] clopidogreL (PLAVIX) tablet,   •  [DISCONTINUED] iopamidoL (ISOVUE-300) 300 mg iodine /mL (61 %) injection,                                                           OBJECTIVE   ROS as in HPI   Constitution: Negative for chills and fever.   Eyes: Negative for blurred vision and visual disturbance.   Cardiovascular: Negative for anginal chest pain, near-syncope, palpitations and syncope.   Respiratory: Negative for hemoptysis or shortness of breath  Hematologic/Lymphatic: Negative for bleeding problem.   Skin: Negative for rash. No new skin changes  Gastrointestinal: Negative for abdominal pain, diarrhea, hematochezia, melena, nausea and vomiting.   Genitourinary: Negative for dysuria and hematuria.   Neurological: Negative for headaches.            Vitals:    25 1444   BP: 120/68   BP Location: Left upper arm   Patient Position: Sitting   Pulse: 80   Resp: 16   Weight: 100 kg (221 lb)   Height: 1.753 m (5' 9\")       BP Readings from Last 3 Encounters:   25 120/68   25 (!) 140/71   25 (!) 148/78     Wt Readings from Last 3 Encounters:   25 100 kg (221 lb)   25 100 kg (221 lb)   25 100 kg (221 lb)       Physical Exam   Constitutional: " "Appears comfortable in no distress  HEENT:  Neck Supple.  No JVD No carotid bruits   Head: Normocephalic.   Eyes: Extraocular movements intact  Cardiovascular: Normal rate, regular rhythm no S3 gallop Exam reveals no friction rub.    Pulmonary/Chest: Effort normal and breath sounds normal. No wheezes.  Abdominal: Soft and nontender. Bowel sounds are normal.   Musculoskeletal: No lower extremity edema.  Radial pulses equal bilaterally.  No hematoma.  Neurological: Alert and oriented to person, place, and time.   Skin: Skin is warm and dry.   Psychiatric: Behavior is normal.            Objective   Lab Results   Component Value Date    CHOL 115 01/24/2025     Lab Results   Component Value Date    HDL 55 01/24/2025     Lab Results   Component Value Date    LDLCALC 46 01/24/2025     Lab Results   Component Value Date    TRIG 55 01/24/2025     Lab Results   Component Value Date    ALT 11 01/24/2025    AST 17 01/24/2025     Lab Results   Component Value Date    WBC 5.7 01/24/2025    HGB 15.6 01/24/2025    HCT 46.7 01/24/2025     01/24/2025     Lab Results   Component Value Date    GLUCOSE 100 (H) 01/24/2025     01/24/2025    K 4.5 01/24/2025    CO2 29 01/24/2025     01/24/2025    BUN 10 01/24/2025    CREATININE 0.68 (L) 01/24/2025     Lab Results   Component Value Date    HGBA1C 6.0 (H) 01/24/2025     No results found for: \"TSH\"  No results found for: \"BNP\"  [unfilled]    Troponin I Results    No lab values to display.       No results found for: \"HSTROPONINI\"                                                       IMAGING   Left Heart Catheterization 1-30-25     Left main: Patent  LAD: Mid LAD has 40% stenosis  LCx: Chronically occluded in its mid segment. Left to left collaterals were noted   RCA: Mid RCA has 30-40% stenosis      Intervention:     Successful  intervention of mid left circumflex. Mid LCx was stented with Synergy 2.25 x 32 mm NABEEL. Postdilatation was performed with NC Emerge 2.50 x 20 mm " and 3.25 x 8 mm balloons. Final angiography revealed 0% residual stenosis and MARY grade III flow distally.    Exercise nuclear stress test 1-  This is an abnormal exercise nuclear stress test.  Diminished exercise capacity.  Exercise EKG negative for ischemia.  Accelerated heart rate response suggestive of deconditioning.  Frequent PVCs and ventricular couplet noted during exercise.  Perfusion images demonstrate a moderate to large area of moderately to severely reduced isotope uptake in the basal and mid inferolateral, mid anterolateral and apical lateral segments with significant reversibility at rest.  Findings are most consistent with ischemia in the territory of the left circumflex coronary artery.  Hyperdynamic LV function with LVEF 77%.  No evidence of transient ischemic dilatation.  Wall motion appears grossly normal.       TRANSTHORACIC ECHO (TTE) COMPLETE 1-20-25    Left Ventricle: Normal ventricle size. Normal wall thickness. Preserved systolic function. Estimated EF 65-70%. No regional wall motion abnormalities. Diastolic inflow pattern consistent with impaired relaxation. Normal left atrial pressure.  •  Right Ventricle: Mildly dilated ventricle size. Normal systolic function.  •  Left Atrium: Mildly dilated atrium.  •  Right Atrium: Mildly dilated atrium.  •  Aortic Valve: Tricuspid valve.  Sclerotic leaflets. No regurgitation. No stenosis.  •  Mitral Valve: Normal leaflet structure. Normal leaflet motion. Trace regurgitation. No stenosis.  •  Tricuspid Valve: Normal structure. Trace regurgitation. Estimated RVSP = 29 mmHg.  •  Aorta: Aortic arch normal-sized. Descending aorta normal-sized. Dilatation at the sinuses of Valsalva=4.3cm. Dilatation of the ascending aorta=4.2cm.  •  Pericardium: No evidence of pericardial effusion.    Reviewed study-no interatrial septal closure device noted      US CAROTID BILATERAL 12/03/2020    Interpretation Summary  · There is less than 50% stenosis in the  left and right internal carotid arteries with at most mild heterogeneous plaque bilaterally.  · Antegrade vertebral flow bilaterally.      EKG  Normal sinus rhythm  Inferior infarct (cited on or before 23-DEC-2021)  When compared with ECG of 30-JAN-2025 13:30,  No significant change was found                                            ASSESSMENT AND PLAN   Mr. De La Garza is an 84-year-old man with the following issues:    Assessment/Plan    Atherosclerosis of coronary artery  Coronary artery calcification noted is moderate on CT scan 2024.  Stress test showed reversible defect and patient had cardiac catheterization 1-30-25 with PCI of left circumflex.    -Aspirin and Plavix for 1 year until 1-.  Patient does not have abnormal bleeding or bruising currently  -Continue with atorvastatin 40 mg daily  -Patient offered cardiac rehabilitation but prefers to walk and do exercise on his own.    Aortic aneurysm (CMS/HCC)  History of thoracic aortic aneurysm 4.1 cm on CT scan 2021.  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  There was no significant change.  Has upcoming follow-up with CT surgery surveillance clinic.  Has stable right pulmonary nodule and has been asked to follow-up with his primary care physician regarding this.      Hyperlipidemia  Atorvastatin 40 mg daily.  Blood work 1-2025 LDL 46 HDL 55 triglycerides 55 normal liver function tests    Patient had a list of questions written down by his wife Beverley.  We discussed all the questions.  I offered to call her but patient states he will tell his wife himself.  If she has any questions she may call the office.        Return june 2025.         Thank you for allowing me to participate in the care of this patient.  I hope this information is helpful.         Duyen Gallego MD [facility]  2/5/2025  3:23 PM    This document was generated utilizing voice recognition technology. A  reasonable attempt at proofreading has been made to minimize errors but please excuse any typographical errors which may be present. Please call with any questions.

## 2025-02-05 NOTE — ASSESSMENT & PLAN NOTE
Coronary artery calcification noted is moderate on CT scan 2024.  Stress test showed reversible defect and patient had cardiac catheterization 1-30-25 with PCI of left circumflex.    -Aspirin and Plavix for 1 year until 1-.  Patient does not have abnormal bleeding or bruising currently  -Continue with atorvastatin 40 mg daily  -Patient offered cardiac rehabilitation but prefers to walk and do exercise on his own.

## 2025-02-17 ENCOUNTER — OFFICE VISIT (OUTPATIENT)
Dept: FAMILY MEDICINE | Facility: CLINIC | Age: 85
End: 2025-02-17
Payer: MEDICARE

## 2025-02-17 VITALS
OXYGEN SATURATION: 99 % | WEIGHT: 221.5 LBS | SYSTOLIC BLOOD PRESSURE: 138 MMHG | TEMPERATURE: 97.1 F | BODY MASS INDEX: 31.71 KG/M2 | HEIGHT: 70 IN | DIASTOLIC BLOOD PRESSURE: 70 MMHG | HEART RATE: 98 BPM

## 2025-02-17 DIAGNOSIS — I25.10 CORONARY ARTERY DISEASE INVOLVING NATIVE CORONARY ARTERY OF NATIVE HEART WITHOUT ANGINA PECTORIS: Primary | ICD-10-CM

## 2025-02-17 DIAGNOSIS — R73.01 IMPAIRED FASTING GLUCOSE: ICD-10-CM

## 2025-02-17 DIAGNOSIS — Z85.038 HISTORY OF COLON CANCER: ICD-10-CM

## 2025-02-17 DIAGNOSIS — I71.20 THORACIC AORTIC ANEURYSM WITHOUT RUPTURE, UNSPECIFIED PART (CMS/HCC): ICD-10-CM

## 2025-02-17 DIAGNOSIS — E78.00 PURE HYPERCHOLESTEROLEMIA: ICD-10-CM

## 2025-02-17 DIAGNOSIS — R91.1 LUNG NODULE: ICD-10-CM

## 2025-02-17 PROBLEM — R06.02 SOB (SHORTNESS OF BREATH): Status: RESOLVED | Noted: 2025-01-23 | Resolved: 2025-02-17

## 2025-02-17 PROCEDURE — 1123F ACP DISCUSS/DSCN MKR DOCD: CPT | Performed by: FAMILY MEDICINE

## 2025-02-17 PROCEDURE — 99214 OFFICE O/P EST MOD 30 MIN: CPT | Performed by: FAMILY MEDICINE

## 2025-02-17 ASSESSMENT — ENCOUNTER SYMPTOMS
CHILLS: 0
PALPITATIONS: 0
FEVER: 0
COUGH: 1
SHORTNESS OF BREATH: 1

## 2025-02-17 NOTE — ASSESSMENT & PLAN NOTE
Continue statin.  Labs before next visit.  Orders:    Comprehensive metabolic panel; Future    Lipid panel; Future

## 2025-02-17 NOTE — ASSESSMENT & PLAN NOTE
Exertional dyspnea improved s/p PCI.  Continue asa, plavix, statin.  Encouraged heart healthy diet.  Needs to start exercising. Warning precautions reviewed.  Orders:    CBC and Differential; Future

## 2025-02-17 NOTE — PROGRESS NOTES
"Subjective    Naif De La Garza is a 84 y.o. male presenting today for: Follow-up      HPI: 83yo male with h/o HL, CAD, thoracic aortic aneurysm, h/o colon ca 2002 here for follow-up of multiple medical problems as listed below.    CAD: PCI 1/30/25 after stress test showing reversible defect.  On asa, plavix.  Feeling better since stenting.   Exercise - None currently.   Diet - Stable     Lingering cough over the past month or two. Did not start with an illness. Does have some post nasal drip.  Usually worse in the morning and has to clear some mucous out.  Feels like he has a \"tickle in his throat.\"  Chronic SOB related to CAD which has improved since stenting.   Intermittent GERD but cough does not seem to worsen with GERD.      Health Maintenance Due   Topic Date Due    Advance Care Planning  Never done    Medicare Annual Wellness Visit  Never done    Falls Risk Screening  Never done    RSV Vaccine (1 - 1-dose 75+ series) Never done     --    Patient Active Problem List   Diagnosis    Coronary artery disease involving native coronary artery of native heart without angina pectoris    Hyperlipidemia    Aortic aneurysm (CMS/HCC)    Abdominal wall strain, initial encounter    History of colon cancer    Lung nodule          Current Outpatient Medications:     aspirin 81 mg enteric coated tablet, Take 81 mg by mouth daily., Disp: , Rfl:     atorvastatin (LIPITOR) 40 mg tablet, TAKE 1 TABLET (40 MG TOTAL) BY MOUTH DAILY., Disp: 90 tablet, Rfl: 3    cholecalciferol, vitamin D3, 1,000 unit (25 mcg) tablet, Take 1,000 Units by mouth daily., Disp: , Rfl:     clopidogreL (PLAVIX) 75 mg tablet, Take 1 tablet (75 mg total) by mouth daily., Disp: 30 tablet, Rfl: 6    triamcinolone (KENALOG) 0.1 % ointment, APPLY TO THE AFFECTED AREA(S) TWICE DAILY FOR UP TO TWO WEEKS AS NEEDED FOR eczema, Disp: , Rfl: 3    ubidecarenone (CO Q-10 ORAL), Take 1 tablet by mouth daily., Disp: , Rfl:     vit C/E/Zn/coppr/lutein/zeaxan (PRESERVISION " "AREDS-2 ORAL), Take by mouth., Disp: , Rfl:     vitamins  A,C,E-zinc-copper 14,320-226-200 unit-mg-unit capsule, Take 2 capsules by mouth daily., Disp: , Rfl:      Allergies   Allergen Reactions    Quinolones      H/O aneurysm            Review of Systems   Constitutional:  Negative for chills and fever.   Respiratory:  Positive for cough and shortness of breath.    Cardiovascular:  Negative for chest pain and palpitations.          Objective  Visit Vitals  /70 (BP Location: Left upper arm, Patient Position: Sitting)   Pulse 98   Temp 36.2 °C (97.1 °F) (Temporal)   Ht 1.765 m (5' 9.5\")   Wt 100 kg (221 lb 8 oz)   SpO2 99%   BMI 32.24 kg/m²    Body mass index is 32.24 kg/m².    Wt Readings from Last 3 Encounters:   02/17/25 100 kg (221 lb 8 oz)   02/05/25 100 kg (221 lb)   01/21/25 100 kg (221 lb)       BP Readings from Last 3 Encounters:   02/17/25 138/70   02/05/25 120/68   01/30/25 (!) 140/71            Physical Exam  Vitals reviewed.   Constitutional:       Appearance: Normal appearance. He is not ill-appearing.   HENT:      Head: Normocephalic and atraumatic.      Right Ear: External ear normal.      Left Ear: External ear normal.   Eyes:      Conjunctiva/sclera: Conjunctivae normal.      Pupils: Pupils are equal, round, and reactive to light.   Cardiovascular:      Rate and Rhythm: Normal rate and regular rhythm.      Heart sounds: No murmur heard.  Pulmonary:      Effort: Pulmonary effort is normal.      Breath sounds: Normal breath sounds. No wheezing, rhonchi or rales.   Skin:     General: Skin is warm and dry.   Neurological:      General: No focal deficit present.      Mental Status: He is alert and oriented to person, place, and time.   Psychiatric:         Mood and Affect: Mood normal.         Behavior: Behavior normal.              Laboratories     Latest Reference Range & Units 01/24/25 08:57   Sodium 135 - 146 mmol/L 141   Potassium, Bld 3.5 - 5.3 mmol/L 4.5   Chloride 98 - 110 mmol/L 103   CO2 " 20 - 32 mmol/L 29   BUN 7 - 25 mg/dL 10   Creatinine 0.70 - 1.22 mg/dL 0.68 (L)   Glucose 65 - 99 mg/dL 100 (H)   Calcium 8.6 - 10.3 mg/dL 8.9   AST (SGOT) 10 - 35 U/L 17   ALT (SGPT) 9 - 46 U/L 11   Alkaline Phosphatase 35 - 144 U/L 80   Total Protein 6.1 - 8.1 g/dL 6.5   Albumin 3.6 - 5.1 g/dL 3.9   Bilirubin, Total 0.2 - 1.2 mg/dL 0.6   eGFR > OR = 60 mL/min/1.73m2 93   Bun/Creatinine Ratio 6 - 22 (calc) 15   Globulin 1.9 - 3.7 g/dL (calc) 2.6   Cholesterol <200 mg/dL 115   Triglycerides <150 mg/dL 55   HDL > OR = 40 mg/dL 55   LDL Calculated mg/dL (calc) 46   Non-HDL, Calculated <130 mg/dL (calc) 60   Chol/Hdlc Ratio <5.0 (calc) 2.1   Hemoglobin A1C <5.7 % of total Hgb 6.0 (H)   ALBUMIN/GLOBULIN RATIO 1.0 - 2.5 (calc) 1.5   WBC 3.8 - 10.8 Thousand/uL 5.7   RBC 4.20 - 5.80 Million/uL 5.22   Hemoglobin 13.2 - 17.1 g/dL 15.6   Hematocrit 38.5 - 50.0 % 46.7   MCV 80.0 - 100.0 fL 89.5   MCH 27.0 - 33.0 pg 29.9   MCHC 32.0 - 36.0 g/dL 33.4   RDW 11.0 - 15.0 % 12.1   Platelets 140 - 400 Thousand/uL 169   MPV 7.5 - 12.5 fL 11.3   Absolute Basophils 0 - 200 cells/uL 40   Absolute Eosinophils 15 - 500 cells/uL 262   Absolute Lymphocytes 850 - 3,900 cells/uL 1,653   Absolute Monocytes 200 - 950 cells/uL 530   Absolute Neutrophils 1,500 - 7,800 cells/uL 3,215   Basophils % 0.7   Eosinophils % 4.6   Lymphocytes % 29.0   Monocytes % 9.3   Neutrophils % 56.4   (L): Data is abnormally low  (H): Data is abnormally high    Imaging/Studies         Assessment/Plan  Assessment & Plan  Coronary artery disease involving native coronary artery of native heart without angina pectoris  Exertional dyspnea improved s/p PCI.  Continue asa, plavix, statin.  Encouraged heart healthy diet.  Needs to start exercising. Warning precautions reviewed.  Orders:    CBC and Differential; Future    Thoracic aortic aneurysm without rupture, unspecified part (CMS/HCC)  Stable on imaging       History of colon cancer         Pure  hypercholesterolemia  Continue statin.  Labs before next visit.  Orders:    Comprehensive metabolic panel; Future    Lipid panel; Future    Lung nodule  4mm RUL nodule - incidental finding on cardiac imaging.  Stable since 2021.  Former smoker, quit 1985.  Asx.  Low risk.  Shared decision making - no further imaging.         Impaired fasting glucose  Needs to cut down on cookies, carbs.  Increase exercise.    Orders:    Hemoglobin A1c; Future        Advance care plan discussed and documented in the medical record       Return in about 6 months (around 8/17/2025).     Yoly Payne MD

## 2025-02-17 NOTE — ASSESSMENT & PLAN NOTE
4mm RUL nodule - incidental finding on cardiac imaging.  Stable since 2021.  Former smoker, quit 1985.  Asx.  Low risk.  Shared decision making - no further imaging.

## 2025-02-17 NOTE — PATIENT INSTRUCTIONS
"Advance Care Planning    Advance care planning lets you share your health care wishes if you can't speak for yourself because of injury, illness, or disease. Some terms you might hear are \"advance directive,\" \"living will,\" and \"healthcare power of .\"    [facility] suggests using PREPARE™ for your care. It's a tool that helps you create an advance directive with free education and guidance.  You can visit [employer] for your care to fill out an advance directive online or download and print a blank form to complete by hand.     A healthcare power of  is someone you choose to make medical decisions for you if you can't understand your condition or speak for yourself. This person is also called a \"healthcare agent\" or \"health care proxy.\" The PREPARE tool has a section to name a healthcare power of . If you're not ready to create an advance directive using PREPARE, you can fill out our one-page power of  form. Visit [facility]-health-care-power-[email or web address] ([facility].[employer]) to download the form.    Please make sure your primary care doctor or provider has a copy of your advance care planning documents. You can do this in three ways:    Give copies of your documents to your primary care team.  Upload your documents to [employer] by going to the Advance Care Planning section in [facility], [facility] - Advance Care Planning.   Send your documents to our medical records department by emailing [email or web address]. Write your last name and \"Advance Care Plan\" in the subject line. Include your full name, date of birth, and phone number in the email. Attach your documents.    For more information, visit Advance Care Planning (Advance Directive)  Patient services  [facility]. You can also find more information in the Advance Care Planning section in [employer].    "

## 2025-03-10 ENCOUNTER — TELEPHONE (OUTPATIENT)
Dept: CARDIOLOGY | Facility: CLINIC | Age: 85
End: 2025-03-10
Payer: MEDICARE

## 2025-03-10 NOTE — TELEPHONE ENCOUNTER
I received a phone call from patient's wife about a bill received for [employer].  She said they contacted the billing office several weeks ago and asked them to send a bill in the mail.  They never received a bill in the mail and was wondering if they could stop by the office to pay or if there is a billing address that they can send a check to.  I told him I would send to front office to see how to proceed.

## 2025-06-27 ENCOUNTER — OFFICE VISIT (OUTPATIENT)
Dept: CARDIOLOGY | Facility: CLINIC | Age: 85
End: 2025-06-27
Payer: MEDICARE

## 2025-06-27 VITALS
HEIGHT: 69 IN | HEART RATE: 63 BPM | WEIGHT: 219 LBS | SYSTOLIC BLOOD PRESSURE: 116 MMHG | BODY MASS INDEX: 32.44 KG/M2 | RESPIRATION RATE: 16 BRPM | DIASTOLIC BLOOD PRESSURE: 70 MMHG

## 2025-06-27 DIAGNOSIS — I71.20 THORACIC AORTIC ANEURYSM WITHOUT RUPTURE, UNSPECIFIED PART (CMS/HCC): ICD-10-CM

## 2025-06-27 DIAGNOSIS — I10 PRIMARY HYPERTENSION: Primary | ICD-10-CM

## 2025-06-27 DIAGNOSIS — I25.10 CORONARY ARTERY DISEASE INVOLVING NATIVE CORONARY ARTERY OF NATIVE HEART WITHOUT ANGINA PECTORIS: ICD-10-CM

## 2025-06-27 DIAGNOSIS — E78.00 PURE HYPERCHOLESTEROLEMIA: ICD-10-CM

## 2025-06-27 LAB
ATRIAL RATE: 63
P AXIS: 46
PR INTERVAL: 204
QRS DURATION: 86
QT INTERVAL: 404
QTC CALCULATION(BAZETT): 413
R AXIS: 4
T WAVE AXIS: 30
VENTRICULAR RATE: 63

## 2025-06-27 PROCEDURE — G8752 SYS BP LESS 140: HCPCS | Performed by: INTERNAL MEDICINE

## 2025-06-27 PROCEDURE — 99213 OFFICE O/P EST LOW 20 MIN: CPT | Performed by: INTERNAL MEDICINE

## 2025-06-27 PROCEDURE — G8754 DIAS BP LESS 90: HCPCS | Performed by: INTERNAL MEDICINE

## 2025-06-27 PROCEDURE — 93000 ELECTROCARDIOGRAM COMPLETE: CPT | Performed by: INTERNAL MEDICINE

## 2025-06-27 RX ORDER — CLOPIDOGREL BISULFATE 75 MG/1
75 TABLET ORAL DAILY
Qty: 90 TABLET | Refills: 3 | Status: SHIPPED | OUTPATIENT
Start: 2025-06-27 | End: 2026-06-27

## 2025-06-27 RX ORDER — ATORVASTATIN CALCIUM 40 MG/1
40 TABLET, FILM COATED ORAL DAILY
Qty: 90 TABLET | Refills: 3 | Status: SHIPPED | OUTPATIENT
Start: 2025-06-27

## 2025-06-27 NOTE — PROGRESS NOTES
Cardiology Outpatient Note    [facility] HEART [facility] Office  [address] [place] [address]     [facility]  The [facility] [address]  [address] [place] [address]     TEL  [phone]  [facility]     Naif De La Garza is a 84 y.o. male patient with history of hyperlipidemia and coronary artery calcifications.  He was noted to have mild calcifications on chest CT 2021.  Had unremarkable treadmill stress test at that time when he was seeing Dr. Barrow.  In 2017 he had a calcium CT scan with a calcium score of 794.  CT scan done in 2024 reported moderate coronary calcifications.  Patient had exercise nuclear stress test which was abnormal.  He underwent cardiac catheterization with PCI of the left circumflex.  Tolerated procedure well.     Patient feels well but is trying to incorporate more exercise/activity into his daily routine.  He has not been walking much because of the heat.  Suggested that [place] maybe [place] and he acknowledges that he may be able to do that.    No history of major surgeries or blood transfusions.  Previous smoker and stopped in 1985.  No clear family history of accelerated coronary artery disease.                                                               PMH     Medical History:  Past Medical History:   Diagnosis Date    Aortic aneurysm (CMS/HCC)     Coronary artery disease     History of colon cancer     Hyperlipidemia     Lipid disorder        Surgical History:  Past Surgical History   Procedure Laterality Date    Bunionectomy Left 2009    Cataract extraction Bilateral 07/2023    Colon surgery  2002    colon cancer resection    IVUS - coronary N/A 1/30/2025    [place] [facility]    LEFT HEART CATH WITH CORONARY ANGIOGRAPHY N/A 1/30/2025    [place] [facility] [place]    Stent NABEEL w/  coronary revascularization for  - initial vessel N/A 2025    Performed by Sunil Grimm [place] [facility] [place] [place]       Social History:  Social History     Tobacco Use    Smoking status: Former     Current packs/day: 0.00     Average packs/day: 1 pack/day for 15.0 years (15.0 ttl pk-yrs)     Types: Cigarettes     Start date:      Quit date:      Years since quittin.5    Smokeless tobacco: Never    Tobacco comments:     Worked for [employer] for 35 years. Did not wear masks.    Vaping Use    Vaping status: Never Used   Substance Use Topics    Alcohol use: Yes     Comment: occasionally    Drug use: No       Family History: He indicated that his biological mother is . He indicated that his biological father is . He indicated that the status of his biological brother is unknown.      Allergies:Quinolones    Current Medications:    Outpatient Encounter Medications as of 2025:     atorvastatin (LIPITOR) 40 mg tablet, Take 1 tablet (40 mg total) by mouth daily.    clopidogreL (PLAVIX) 75 mg tablet, Take 1 tablet (75 mg total) by mouth daily.    vit C/E/Zn/coppr/lutein/zeaxan (PRESERVISION AREDS-2 ORAL), Take 1 tablet by mouth 2 (two) times a day.    [DISCONTINUED] clopidogreL (PLAVIX) 75 mg tablet, Take 1 tablet (75 mg total) by mouth daily.    cholecalciferol, vitamin D3, 1,000 unit (25 mcg) tablet, Take 1,000 Units by mouth daily.    [DISCONTINUED] atorvastatin (LIPITOR) 40 mg tablet, TAKE 1 TABLET (40 MG TOTAL) BY MOUTH DAILY.    ubidecarenone (CO Q-10 ORAL), Take 1 tablet by mouth daily.    triamcinolone (KENALOG) 0.1 % ointment, APPLY TO THE AFFECTED AREA(S) TWICE DAILY FOR UP TO TWO WEEKS AS NEEDED FOR eczema    aspirin 81 mg enteric coated tablet, Take 81 mg by mouth daily.    [DISCONTINUED] vitamins  A,C,E-zinc-copper 14,320-226-200 unit-mg-unit capsule, Take 2 capsules by mouth daily.                                                          OBJECTIVE   ROS as  "in HPI   Constitution: Negative for chills and fever.   Eyes: Negative for blurred vision and visual disturbance.   Cardiovascular: Negative for anginal chest pain, near-syncope, palpitations and syncope.   Respiratory: Negative for hemoptysis or shortness of breath  Hematologic/Lymphatic: Negative for bleeding problem.   Skin: Negative for rash. No new skin changes  Gastrointestinal: Negative for abdominal pain, diarrhea, hematochezia, melena, nausea and vomiting.   Genitourinary: Negative for dysuria and hematuria.   Neurological: Negative for headaches.            Vitals:    06/27/25 1425   BP: 116/70   BP Location: Left upper arm   Patient Position: Sitting   Pulse: 63   Resp: 16   Weight: 99.3 kg (219 lb)   Height: 1.765 m (5' 9.5\")       BP Readings from Last 3 Encounters:   06/27/25 116/70   02/17/25 138/70   02/05/25 120/68     Wt Readings from Last 3 Encounters:   06/27/25 99.3 kg (219 lb)   02/17/25 100 kg (221 lb 8 oz)   02/05/25 100 kg (221 lb)       Physical Exam   Constitutional: Appears comfortable in no distress  HEENT:  Neck Supple.  No JVD No carotid bruits   Head: Normocephalic.   Eyes: Extraocular movements intact  Cardiovascular: Normal rate, regular rhythm no S3 gallop Exam reveals no friction rub.    Pulmonary/Chest: Effort normal and breath sounds normal. No wheezes.  Abdominal: Soft and nontender. Bowel sounds are normal.   Musculoskeletal: No lower extremity edema.  Radial pulses equal bilaterally.  No hematoma.  Neurological: Alert and oriented to person, place, and time.   Skin: Skin is warm and dry.   Psychiatric: Behavior is normal.            Objective   Lab Results   Component Value Date    CHOL 115 01/24/2025     Lab Results   Component Value Date    HDL 55 01/24/2025     Lab Results   Component Value Date    LDLCALC 46 01/24/2025     Lab Results   Component Value Date    TRIG 55 01/24/2025     Lab Results   Component Value Date    ALT 11 01/24/2025    AST 17 01/24/2025     Lab " "Results   Component Value Date    WBC 5.7 01/24/2025    HGB 15.6 01/24/2025    HCT 46.7 01/24/2025     01/24/2025     Lab Results   Component Value Date    GLUCOSE 100 (H) 01/24/2025     01/24/2025    K 4.5 01/24/2025    CO2 29 01/24/2025     01/24/2025    BUN 10 01/24/2025    CREATININE 0.68 (L) 01/24/2025     Lab Results   Component Value Date    HGBA1C 6.0 (H) 01/24/2025     No results found for: \"TSH\"  No results found for: \"BNP\"  [unfilled]    Troponin I Results    No lab values to display.       No results found for: \"HSTROPONINI\"                                                       IMAGING   Left Heart Catheterization 1-30-25     Left main: Patent  LAD: Mid LAD has 40% stenosis  LCx: Chronically occluded in its mid segment. Left to left collaterals were noted   RCA: Mid RCA has 30-40% stenosis      Intervention:     Successful  intervention of mid left circumflex. Mid LCx was stented with Synergy 2.25 x 32 mm NABEEL. Postdilatation was performed with NC Emerge 2.50 x 20 mm and 3.25 x 8 mm balloons. Final angiography revealed 0% residual stenosis and MARY grade III flow distally.    Exercise nuclear stress test 1-  This is an abnormal exercise nuclear stress test.  Diminished exercise capacity.  Exercise EKG negative for ischemia.  Accelerated heart rate response suggestive of deconditioning.  Frequent PVCs and ventricular couplet noted during exercise.  Perfusion images demonstrate a moderate to large area of moderately to severely reduced isotope uptake in the basal and mid inferolateral, mid anterolateral and apical lateral segments with significant reversibility at rest.  Findings are most consistent with ischemia in the territory of the left circumflex coronary artery.  Hyperdynamic LV function with LVEF 77%.  No evidence of transient ischemic dilatation.  Wall motion appears grossly normal.       TRANSTHORACIC ECHO (TTE) COMPLETE 1-20-25    Left Ventricle: Normal ventricle size. " Normal wall thickness. Preserved systolic function. Estimated EF 65-70%. No regional wall motion abnormalities. Diastolic inflow pattern consistent with impaired relaxation. Normal left atrial pressure.    Right Ventricle: Mildly dilated ventricle size. Normal systolic function.    Left Atrium: Mildly dilated atrium.    Right Atrium: Mildly dilated atrium.    Aortic Valve: Tricuspid valve.  Sclerotic leaflets. No regurgitation. No stenosis.    Mitral Valve: Normal leaflet structure. Normal leaflet motion. Trace regurgitation. No stenosis.    Tricuspid Valve: Normal structure. Trace regurgitation. Estimated RVSP = 29 mmHg.    Aorta: Aortic arch normal-sized. Descending aorta normal-sized. Dilatation at the sinuses of Valsalva=4.3cm. Dilatation of the ascending aorta=4.2cm.    Pericardium: No evidence of pericardial effusion.    Reviewed study-no interatrial septal closure device noted      US CAROTID BILATERAL 12/03/2020    Interpretation Summary  · There is less than 50% stenosis in the left and right internal carotid arteries with at most mild heterogeneous plaque bilaterally.  · Antegrade vertebral flow bilaterally.      EKG  Normal sinus rhythm 63bpm [id number]  Normal ECG  When compared with ECG of 05-FEB-2025 14:59,  No significant change was found                                            ASSESSMENT AND PLAN   Mr. De LaG arza is an 84-year-old man with the following issues:    Assessment/Plan    Aortic aneurysm (CMS/[facility])  History of thoracic aortic aneurysm 4.1 cm on CT scan 2021.  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  There was no significant change.  Has upcoming follow-up with CT surgery surveillance clinic.  Has stable right pulmonary nodule and has been asked to follow-up with his primary care physician regarding this.      Coronary artery disease involving native coronary artery of native heart without angina  pectoris  Coronary artery calcification noted is moderate on CT scan 2024.  Stress test showed reversible defect and patient had cardiac catheterization 1-30-25 with PCI of left circumflex.    -Aspirin and Plavix for 1 year until 1-.  Patient does not have abnormal bleeding or bruising currently  -Continue with atorvastatin 40 mg daily      Hyperlipidemia  Atorvastatin 40 mg daily.  Blood work 1-2025 LDL 46 HDL 55 triglycerides 55 normal liver function tests            Return in about 7 months (around 1/27/2026).         Thank you for allowing me to participate in the care of this patient.  I hope this information is helpful.         Duyen Gallego MD [facility]  6/27/2025  4:11 PM    This document was generated utilizing voice recognition technology. A reasonable attempt at proofreading has been made to minimize errors but please excuse any typographical errors which may be present. Please call with any questions.

## 2025-06-27 NOTE — LETTER
June 27, 2025     Yoly Payne MD  [address] [address]    Patient: Naif De La Garza  YOB: 1940  Date of Visit: 6/27/2025      Dear Dr. Payne:    Thank you for referring Naif De La Garza to me for evaluation. Below are my notes for this consultation.    If you have questions, please do not hesitate to call me. I look forward to following your patient along with you.         Sincerely,        Duyen Gallego MD        CC: No Recipients    Duyen Gallego MD  6/27/2025  4:11 PM  Sign when Signing Visit       Cardiology Outpatient Note    [facility] Office  [address] [place] [address]     [facility]  [facility] Level  [address] [place] [address]     TEL  [phone]  [facility]     Naif De La Garza is a 84 y.o. male patient with history of hyperlipidemia and coronary artery calcifications.  He was noted to have mild calcifications on chest CT 2021.  Had unremarkable treadmill stress test at that time when he was seeing Dr. Barrow.  In 2017 he had a calcium CT scan with a calcium score of 794.  CT scan done in 2024 reported moderate coronary calcifications.  Patient had exercise nuclear stress test which was abnormal.  He underwent cardiac catheterization with PCI of the left circumflex.  Tolerated procedure well.     Patient feels well but is trying to incorporate more exercise/activity into his daily routine.  He has not been walking much because of the heat.  Suggested that he maybe go [place] which [place] and he acknowledges that he may be able to do that.    No history of major surgeries or blood transfusions.  Previous smoker and stopped in 1985.  No clear family history of accelerated coronary artery disease.                                                               PMH     Medical History:  Past Medical History:   Diagnosis  Date   • Aortic aneurysm (CMS/HCC)    • Coronary artery disease    • History of colon cancer    • Hyperlipidemia    • Lipid disorder        Surgical History:  Past Surgical History   Procedure Laterality Date   • Bunionectomy Left    • Cataract extraction Bilateral 2023   • Colon surgery      colon cancer resection   • IVUS - coronary N/A 2025    Performed by Sunil Grimm MD at [facility] CARDIAC CATH/EP   • LEFT HEART CATH WITH CORONARY ANGIOGRAPHY N/A 2025    Performed by Sunil Grimm MD at [facility] CARDIAC CATH/EP   • Stent NABEEL w/ coronary revascularization for  - initial vessel N/A 2025    Performed by Sunil Grimm MD at [facility] CARDIAC CATH/EP       Social History:  Social History     Tobacco Use   • Smoking status: Former     Current packs/day: 0.00     Average packs/day: 1 pack/day for 15.0 years (15.0 ttl pk-yrs)     Types: Cigarettes     Start date:      Quit date:      Years since quittin.5   • Smokeless tobacco: Never   • Tobacco comments:     Worked for [employer] for 35 years. Did not wear masks.    Vaping Use   • Vaping status: Never Used   Substance Use Topics   • Alcohol use: Yes     Comment: occasionally   • Drug use: No       Family History: He indicated that his biological mother is . He indicated that his biological father is . He indicated that the status of his biological brother is unknown.      Allergies:Quinolones    Current Medications:    Outpatient Encounter Medications as of 2025:   •  atorvastatin (LIPITOR) 40 mg tablet, Take 1 tablet (40 mg total) by mouth daily.  •  clopidogreL (PLAVIX) 75 mg tablet, Take 1 tablet (75 mg total) by mouth daily.  •  vit C/E/Zn/coppr/lutein/zeaxan (PRESERVISION AREDS-2 ORAL), Take 1 tablet by mouth 2 (two) times a day.  •  [DISCONTINUED] clopidogreL (PLAVIX) 75 mg tablet, Take 1 tablet (75 mg total) by mouth daily.  •  cholecalciferol, vitamin D3, 1,000 unit (25 mcg) tablet, Take  "1,000 Units by mouth daily.  •  [DISCONTINUED] atorvastatin (LIPITOR) 40 mg tablet, TAKE 1 TABLET (40 MG TOTAL) BY MOUTH DAILY.  •  ubidecarenone (CO Q-10 ORAL), Take 1 tablet by mouth daily.  •  triamcinolone (KENALOG) 0.1 % ointment, APPLY TO THE AFFECTED AREA(S) TWICE DAILY FOR UP TO TWO WEEKS AS NEEDED FOR eczema  •  aspirin 81 mg enteric coated tablet, Take 81 mg by mouth daily.  •  [DISCONTINUED] vitamins  A,C,E-zinc-copper 14,320-226-200 unit-mg-unit capsule, Take 2 capsules by mouth daily.                                                          OBJECTIVE   ROS as in HPI   Constitution: Negative for chills and fever.   Eyes: Negative for blurred vision and visual disturbance.   Cardiovascular: Negative for anginal chest pain, near-syncope, palpitations and syncope.   Respiratory: Negative for hemoptysis or shortness of breath  Hematologic/Lymphatic: Negative for bleeding problem.   Skin: Negative for rash. No new skin changes  Gastrointestinal: Negative for abdominal pain, diarrhea, hematochezia, melena, nausea and vomiting.   Genitourinary: Negative for dysuria and hematuria.   Neurological: Negative for headaches.            Vitals:    06/27/25 1425   BP: 116/70   BP Location: Left upper arm   Patient Position: Sitting   Pulse: 63   Resp: 16   Weight: 99.3 kg (219 lb)   Height: 1.765 m (5' 9.5\")       BP Readings from Last 3 Encounters:   06/27/25 116/70   02/17/25 138/70   02/05/25 120/68     Wt Readings from Last 3 Encounters:   06/27/25 99.3 kg (219 lb)   02/17/25 100 kg (221 lb 8 oz)   02/05/25 100 kg (221 lb)       Physical Exam   Constitutional: Appears comfortable in no distress  HEENT:  Neck Supple.  No JVD No carotid bruits   Head: Normocephalic.   Eyes: Extraocular movements intact  Cardiovascular: Normal rate, regular rhythm no S3 gallop Exam reveals no friction rub.    Pulmonary/Chest: Effort normal and breath sounds normal. No wheezes.  Abdominal: Soft and nontender. Bowel sounds are normal. " "  Musculoskeletal: No lower extremity edema.  Radial pulses equal bilaterally.  No hematoma.  Neurological: Alert and oriented to person, place, and time.   Skin: Skin is warm and dry.   Psychiatric: Behavior is normal.            Objective   Lab Results   Component Value Date    CHOL 115 01/24/2025     Lab Results   Component Value Date    HDL 55 01/24/2025     Lab Results   Component Value Date    LDLCALC 46 01/24/2025     Lab Results   Component Value Date    TRIG 55 01/24/2025     Lab Results   Component Value Date    ALT 11 01/24/2025    AST 17 01/24/2025     Lab Results   Component Value Date    WBC 5.7 01/24/2025    HGB 15.6 01/24/2025    HCT 46.7 01/24/2025     01/24/2025     Lab Results   Component Value Date    GLUCOSE 100 (H) 01/24/2025     01/24/2025    K 4.5 01/24/2025    CO2 29 01/24/2025     01/24/2025    BUN 10 01/24/2025    CREATININE 0.68 (L) 01/24/2025     Lab Results   Component Value Date    HGBA1C 6.0 (H) 01/24/2025     No results found for: \"TSH\"  No results found for: \"BNP\"  [unfilled]    Troponin I Results    No lab values to display.       No results found for: \"HSTROPONINI\"                                                       IMAGING   Left Heart Catheterization 1-30-25     Left main: Patent  LAD: Mid LAD has 40% stenosis  LCx: Chronically occluded in its mid segment. Left to left collaterals were noted   RCA: Mid RCA has 30-40% stenosis      Intervention:     Successful  intervention of mid left circumflex. Mid LCx was stented with Synergy 2.25 x 32 mm NABEEL. Postdilatation was performed with NC Emerge 2.50 x 20 mm and 3.25 x 8 mm balloons. Final angiography revealed 0% residual stenosis and MARY grade III flow distally.    Exercise nuclear stress test 1-  This is an abnormal exercise nuclear stress test.  Diminished exercise capacity.  Exercise EKG negative for ischemia.  Accelerated heart rate response suggestive of deconditioning.  Frequent PVCs and " ventricular couplet noted during exercise.  Perfusion images demonstrate a moderate to large area of moderately to severely reduced isotope uptake in the basal and mid inferolateral, mid anterolateral and apical lateral segments with significant reversibility at rest.  Findings are most consistent with ischemia in the territory of the left circumflex coronary artery.  Hyperdynamic LV function with LVEF 77%.  No evidence of transient ischemic dilatation.  Wall motion appears grossly normal.       TRANSTHORACIC ECHO (TTE) COMPLETE 1-20-25    Left Ventricle: Normal ventricle size. Normal wall thickness. Preserved systolic function. Estimated EF 65-70%. No regional wall motion abnormalities. Diastolic inflow pattern consistent with impaired relaxation. Normal left atrial pressure.  •  Right Ventricle: Mildly dilated ventricle size. Normal systolic function.  •  Left Atrium: Mildly dilated atrium.  •  Right Atrium: Mildly dilated atrium.  •  Aortic Valve: Tricuspid valve.  Sclerotic leaflets. No regurgitation. No stenosis.  •  Mitral Valve: Normal leaflet structure. Normal leaflet motion. Trace regurgitation. No stenosis.  •  Tricuspid Valve: Normal structure. Trace regurgitation. Estimated RVSP = 29 mmHg.  •  Aorta: Aortic arch normal-sized. Descending aorta normal-sized. Dilatation at the sinuses of Valsalva=4.3cm. Dilatation of the ascending aorta=4.2cm.  •  Pericardium: No evidence of pericardial effusion.    Reviewed study-no interatrial septal closure device noted      US CAROTID BILATERAL 12/03/2020    Interpretation Summary  · There is less than 50% stenosis in the left and right internal carotid arteries with at most mild heterogeneous plaque bilaterally.  · Antegrade vertebral flow bilaterally.      EKG  Normal sinus rhythm 63bpm [id number]  Normal ECG  When compared with ECG of 05-FEB-2025 14:59,  No significant change was found                                            ASSESSMENT AND PLAN   Mr. De La Garza is an  84-year-old man with the following issues:    Assessment/Plan    Aortic aneurysm (CMS/HCC)  History of thoracic aortic aneurysm 4.1 cm on CT scan 2021.  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  There was no significant change.  Has upcoming follow-up with CT surgery surveillance clinic.  Has stable right pulmonary nodule and has been asked to follow-up with his primary care physician regarding this.      Coronary artery disease involving native coronary artery of native heart without angina pectoris  Coronary artery calcification noted is moderate on CT scan 2024.  Stress test showed reversible defect and patient had cardiac catheterization 1-30-25 with PCI of left circumflex.    -Aspirin and Plavix for 1 year until 1-.  Patient does not have abnormal bleeding or bruising currently  -Continue with atorvastatin 40 mg daily      Hyperlipidemia  Atorvastatin 40 mg daily.  Blood work 1-2025 LDL 46 HDL 55 triglycerides 55 normal liver function tests            Return in about 7 months (around 1/27/2026).         Thank you for allowing me to participate in the care of this patient.  I hope this information is helpful.         Duyen Gallego MD [facility]  6/27/2025  4:11 PM    This document was generated utilizing voice recognition technology. A reasonable attempt at proofreading has been made to minimize errors but please excuse any typographical errors which may be present. Please call with any questions.

## 2025-06-27 NOTE — ASSESSMENT & PLAN NOTE
Coronary artery calcification noted is moderate on CT scan 2024.  Stress test showed reversible defect and patient had cardiac catheterization 1-30-25 with PCI of left circumflex.    -Aspirin and Plavix for 1 year until 1-.  Patient does not have abnormal bleeding or bruising currently  -Continue with atorvastatin 40 mg daily

## 2025-08-13 LAB
ALBUMIN SERPL-MCNC: 4 G/DL (ref 3.7–4.7)
ALP SERPL-CCNC: 70 IU/L (ref 44–121)
ALT SERPL-CCNC: 11 IU/L (ref 0–44)
AST SERPL-CCNC: 18 IU/L (ref 0–40)
BASOPHILS # BLD AUTO: 0 X10E3/UL (ref 0–0.2)
BASOPHILS NFR BLD AUTO: 1 %
BILIRUB SERPL-MCNC: 0.6 MG/DL (ref 0–1.2)
BUN SERPL-MCNC: 12 MG/DL (ref 8–27)
BUN SERPL-MCNC: 12 MG/DL (ref 8–27)
BUN/CREAT SERPL: 18 (ref 10–24)
CALCIUM SERPL-MCNC: 8.8 MG/DL (ref 8.6–10.2)
CHLORIDE SERPL-SCNC: 104 MMOL/L (ref 96–106)
CHOLEST SERPL-MCNC: 135 MG/DL (ref 100–199)
CO2 SERPL-SCNC: 22 MMOL/L (ref 20–29)
CREAT SERPL-MCNC: 0.67 MG/DL (ref 0.76–1.27)
CREAT SERPL-MCNC: 0.68 MG/DL (ref 0.76–1.27)
EGFRCR SERPLBLD CKD-EPI 2021: 93 ML/MIN/1.73
EGFRCR SERPLBLD CKD-EPI 2021: 93 ML/MIN/1.73
EOSINOPHIL # BLD AUTO: 0.2 X10E3/UL (ref 0–0.4)
EOSINOPHIL NFR BLD AUTO: 4 %
ERYTHROCYTE [DISTWIDTH] IN BLOOD BY AUTOMATED COUNT: 13.1 % (ref 11.6–15.4)
GLOBULIN SER CALC-MCNC: 2.5 G/DL (ref 1.5–4.5)
GLUCOSE SERPL-MCNC: 94 MG/DL (ref 70–99)
HBA1C MFR BLD: 5.8 % (ref 4.8–5.6)
HCT VFR BLD AUTO: 44.3 % (ref 37.5–51)
HDLC SERPL-MCNC: 56 MG/DL
HGB BLD-MCNC: 14.7 G/DL (ref 13–17.7)
IMM GRANULOCYTES # BLD AUTO: 0 X10E3/UL (ref 0–0.1)
IMM GRANULOCYTES NFR BLD AUTO: 0 %
LDLC SERPL CALC-MCNC: 63 MG/DL (ref 0–99)
LYMPHOCYTES # BLD AUTO: 1.3 X10E3/UL (ref 0.7–3.1)
LYMPHOCYTES NFR BLD AUTO: 27 %
MCH RBC QN AUTO: 30.5 PG (ref 26.6–33)
MCHC RBC AUTO-ENTMCNC: 33.2 G/DL (ref 31.5–35.7)
MCV RBC AUTO: 92 FL (ref 79–97)
MONOCYTES # BLD AUTO: 0.4 X10E3/UL (ref 0.1–0.9)
MONOCYTES NFR BLD AUTO: 9 %
NEUTROPHILS # BLD AUTO: 2.8 X10E3/UL (ref 1.4–7)
NEUTROPHILS NFR BLD AUTO: 59 %
PLATELET # BLD AUTO: 140 X10E3/UL (ref 150–450)
POTASSIUM SERPL-SCNC: 4.4 MMOL/L (ref 3.5–5.2)
PROT SERPL-MCNC: 6.5 G/DL (ref 6–8.5)
RBC # BLD AUTO: 4.82 X10E6/UL (ref 4.14–5.8)
SODIUM SERPL-SCNC: 139 MMOL/L (ref 134–144)
TRIGL SERPL-MCNC: 80 MG/DL (ref 0–149)
VLDLC SERPL CALC-MCNC: 16 MG/DL (ref 5–40)
WBC # BLD AUTO: 4.8 X10E3/UL (ref 3.4–10.8)

## 2025-08-18 ENCOUNTER — OFFICE VISIT (OUTPATIENT)
Dept: FAMILY MEDICINE | Facility: CLINIC | Age: 85
End: 2025-08-18
Payer: MEDICARE

## 2025-08-18 VITALS
HEIGHT: 70 IN | DIASTOLIC BLOOD PRESSURE: 70 MMHG | SYSTOLIC BLOOD PRESSURE: 128 MMHG | HEART RATE: 97 BPM | BODY MASS INDEX: 31.01 KG/M2 | WEIGHT: 216.6 LBS | OXYGEN SATURATION: 97 % | TEMPERATURE: 97.2 F

## 2025-08-18 DIAGNOSIS — Z85.038 HISTORY OF COLON CANCER: ICD-10-CM

## 2025-08-18 DIAGNOSIS — D69.6 THROMBOCYTOPENIA (CMS/HCC): ICD-10-CM

## 2025-08-18 DIAGNOSIS — I25.10 CORONARY ARTERY DISEASE INVOLVING NATIVE CORONARY ARTERY OF NATIVE HEART WITHOUT ANGINA PECTORIS: ICD-10-CM

## 2025-08-18 DIAGNOSIS — R91.1 LUNG NODULE: Primary | ICD-10-CM

## 2025-08-18 DIAGNOSIS — E78.00 PURE HYPERCHOLESTEROLEMIA: ICD-10-CM

## 2025-08-18 DIAGNOSIS — I71.20 THORACIC AORTIC ANEURYSM WITHOUT RUPTURE, UNSPECIFIED PART (CMS/HCC): ICD-10-CM

## 2025-08-18 PROBLEM — S39.011A ABDOMINAL WALL STRAIN, INITIAL ENCOUNTER: Status: RESOLVED | Noted: 2024-08-06 | Resolved: 2025-08-18

## 2025-08-18 PROCEDURE — G2211 COMPLEX E/M VISIT ADD ON: HCPCS | Performed by: FAMILY MEDICINE

## 2025-08-18 PROCEDURE — 99214 OFFICE O/P EST MOD 30 MIN: CPT | Performed by: FAMILY MEDICINE

## 2025-08-18 ASSESSMENT — ENCOUNTER SYMPTOMS
NERVOUS/ANXIOUS: 0
DIARRHEA: 0
ROS SKIN COMMENTS: BRUISING
PALPITATIONS: 0
DYSPHORIC MOOD: 0
CONSTIPATION: 0
DIZZINESS: 0
FEVER: 0
LIGHT-HEADEDNESS: 0
ARTHRALGIAS: 1
COUGH: 0
SHORTNESS OF BREATH: 0
VOMITING: 0
NAUSEA: 0
CHILLS: 0
ABDOMINAL PAIN: 0
HEADACHES: 0

## 2025-08-18 ASSESSMENT — PATIENT HEALTH QUESTIONNAIRE - PHQ9: SUM OF ALL RESPONSES TO PHQ9 QUESTIONS 1 & 2: 0

## (undated) DEVICE — GUIDEWIRE DIAGNOSTIC 035-260 EXCHANGE

## (undated) DEVICE — NEEDLE PERCUTANEOUS ENTRY

## (undated) DEVICE — CATHETER DIAGNOSTIC DXTERITY 6FR R100CM

## (undated) DEVICE — CATH D 6F FR4 100CM

## (undated) DEVICE — SHEET DRAPE 3/4 REVERSEFOLD 53X77

## (undated) DEVICE — DEVICE INFLATION 20/30 PRIORITY PACK W/COPILOT

## (undated) DEVICE — TR BAND REGULAR

## (undated) DEVICE — GUIDEWIRE BMW UNIVERSAL 190CM "J"

## (undated) DEVICE — GUIDEWIRE RUNTHROUGH NS .014 X 180

## (undated) DEVICE — GUIDEWIRE ASAHI FIELDER XT 300CM

## (undated) DEVICE — CATH BALL FG NC EMERGE MR, US 3.25MM X 8MM

## (undated) DEVICE — ***USE 121412***PACK DEVICE IMPLANT TLH

## (undated) DEVICE — GLIDESHEATH SLENDER SS (.021) 6FR 10CM

## (undated) DEVICE — CATH GUIDE ADROIT 6FR .072 XB3 100CM

## (undated) DEVICE — CATH TURNPIKE LP 3FR 135CM

## (undated) DEVICE — INFLATION DEVICE ENCORE 26

## (undated) DEVICE — CATH BALLOON MINI TREK RX 2.00X20MM

## (undated) DEVICE — KIT CATH LAB ANGIO

## (undated) DEVICE — CATH BALL FG NC EMERGE MR, US 2.50MM X 20MM

## (undated) DEVICE — BALLOON PTCA RX TAKERU 1.5MM X 15MM

## (undated) DEVICE — CATH EAGLE EYE PLATINUM